# Patient Record
Sex: FEMALE | Race: ASIAN | NOT HISPANIC OR LATINO | Employment: OTHER | URBAN - METROPOLITAN AREA
[De-identification: names, ages, dates, MRNs, and addresses within clinical notes are randomized per-mention and may not be internally consistent; named-entity substitution may affect disease eponyms.]

---

## 2020-10-24 ENCOUNTER — IMMUNIZATION (OUTPATIENT)
Dept: SOCIAL WORK | Facility: CLINIC | Age: 75
End: 2020-10-24
Payer: COMMERCIAL

## 2020-10-24 DIAGNOSIS — Z23 NEED FOR VACCINATION: Primary | ICD-10-CM

## 2020-10-24 PROCEDURE — 90662 IIV NO PRSV INCREASED AG IM: CPT | Performed by: REGISTERED NURSE

## 2020-10-24 PROCEDURE — 90471 IMMUNIZATION ADMIN: CPT | Performed by: REGISTERED NURSE

## 2022-04-06 ENCOUNTER — APPOINTMENT (OUTPATIENT)
Dept: RADIOLOGY | Facility: MEDICAL CENTER | Age: 77
End: 2022-04-06
Attending: EMERGENCY MEDICINE
Payer: MEDICAID

## 2022-04-06 ENCOUNTER — HOSPITAL ENCOUNTER (OUTPATIENT)
Facility: MEDICAL CENTER | Age: 77
End: 2022-04-08
Attending: EMERGENCY MEDICINE | Admitting: STUDENT IN AN ORGANIZED HEALTH CARE EDUCATION/TRAINING PROGRAM
Payer: MEDICAID

## 2022-04-06 DIAGNOSIS — S09.90XA CLOSED HEAD INJURY, INITIAL ENCOUNTER: ICD-10-CM

## 2022-04-06 DIAGNOSIS — M10.9 GOUT, UNSPECIFIED CAUSE, UNSPECIFIED CHRONICITY, UNSPECIFIED SITE: ICD-10-CM

## 2022-04-06 DIAGNOSIS — R55 SYNCOPE AND COLLAPSE: ICD-10-CM

## 2022-04-06 DIAGNOSIS — I10 HYPERTENSION, UNSPECIFIED TYPE: ICD-10-CM

## 2022-04-06 DIAGNOSIS — S01.01XA LACERATION OF SCALP, INITIAL ENCOUNTER: ICD-10-CM

## 2022-04-06 DIAGNOSIS — R55 SYNCOPE, UNSPECIFIED SYNCOPE TYPE: ICD-10-CM

## 2022-04-06 PROBLEM — R73.9 HYPERGLYCEMIA: Status: ACTIVE | Noted: 2022-04-06

## 2022-04-06 PROBLEM — E87.1 HYPONATREMIA: Status: ACTIVE | Noted: 2022-04-06

## 2022-04-06 PROBLEM — Z71.89 ACP (ADVANCE CARE PLANNING): Status: ACTIVE | Noted: 2022-04-06

## 2022-04-06 LAB
ALBUMIN SERPL BCP-MCNC: 4.3 G/DL (ref 3.2–4.9)
ALBUMIN/GLOB SERPL: 1.3 G/DL
ALP SERPL-CCNC: 101 U/L (ref 30–99)
ALT SERPL-CCNC: 23 U/L (ref 2–50)
ANION GAP SERPL CALC-SCNC: 14 MMOL/L (ref 7–16)
APPEARANCE UR: CLEAR
AST SERPL-CCNC: 27 U/L (ref 12–45)
BACTERIA #/AREA URNS HPF: NEGATIVE /HPF
BASOPHILS # BLD AUTO: 0.3 % (ref 0–1.8)
BASOPHILS # BLD: 0.03 K/UL (ref 0–0.12)
BILIRUB SERPL-MCNC: 0.5 MG/DL (ref 0.1–1.5)
BILIRUB UR QL STRIP.AUTO: NEGATIVE
BUN SERPL-MCNC: 21 MG/DL (ref 8–22)
CALCIUM SERPL-MCNC: 9.5 MG/DL (ref 8.5–10.5)
CHLORIDE SERPL-SCNC: 98 MMOL/L (ref 96–112)
CO2 SERPL-SCNC: 19 MMOL/L (ref 20–33)
COLOR UR: YELLOW
CREAT SERPL-MCNC: 0.63 MG/DL (ref 0.5–1.4)
EOSINOPHIL # BLD AUTO: 0.2 K/UL (ref 0–0.51)
EOSINOPHIL NFR BLD: 2.1 % (ref 0–6.9)
EPI CELLS #/AREA URNS HPF: NEGATIVE /HPF
ERYTHROCYTE [DISTWIDTH] IN BLOOD BY AUTOMATED COUNT: 49.7 FL (ref 35.9–50)
GFR SERPLBLD CREATININE-BSD FMLA CKD-EPI: 92 ML/MIN/1.73 M 2
GLOBULIN SER CALC-MCNC: 3.4 G/DL (ref 1.9–3.5)
GLUCOSE BLD STRIP.AUTO-MCNC: 107 MG/DL (ref 65–99)
GLUCOSE SERPL-MCNC: 149 MG/DL (ref 65–99)
GLUCOSE UR STRIP.AUTO-MCNC: NEGATIVE MG/DL
HCT VFR BLD AUTO: 42.9 % (ref 37–47)
HGB BLD-MCNC: 14.1 G/DL (ref 12–16)
HYALINE CASTS #/AREA URNS LPF: NORMAL /LPF
IMM GRANULOCYTES # BLD AUTO: 0.04 K/UL (ref 0–0.11)
IMM GRANULOCYTES NFR BLD AUTO: 0.4 % (ref 0–0.9)
KETONES UR STRIP.AUTO-MCNC: NEGATIVE MG/DL
LEUKOCYTE ESTERASE UR QL STRIP.AUTO: ABNORMAL
LYMPHOCYTES # BLD AUTO: 2.62 K/UL (ref 1–4.8)
LYMPHOCYTES NFR BLD: 27.4 % (ref 22–41)
MCH RBC QN AUTO: 31.5 PG (ref 27–33)
MCHC RBC AUTO-ENTMCNC: 32.9 G/DL (ref 33.6–35)
MCV RBC AUTO: 95.8 FL (ref 81.4–97.8)
MICRO URNS: ABNORMAL
MONOCYTES # BLD AUTO: 0.73 K/UL (ref 0–0.85)
MONOCYTES NFR BLD AUTO: 7.6 % (ref 0–13.4)
NEUTROPHILS # BLD AUTO: 5.95 K/UL (ref 2–7.15)
NEUTROPHILS NFR BLD: 62.2 % (ref 44–72)
NITRITE UR QL STRIP.AUTO: NEGATIVE
NRBC # BLD AUTO: 0 K/UL
NRBC BLD-RTO: 0 /100 WBC
PH UR STRIP.AUTO: 6.5 [PH] (ref 5–8)
PLATELET # BLD AUTO: 289 K/UL (ref 164–446)
PMV BLD AUTO: 9 FL (ref 9–12.9)
POTASSIUM SERPL-SCNC: 3.9 MMOL/L (ref 3.6–5.5)
PROT SERPL-MCNC: 7.7 G/DL (ref 6–8.2)
PROT UR QL STRIP: NEGATIVE MG/DL
RBC # BLD AUTO: 4.48 M/UL (ref 4.2–5.4)
RBC # URNS HPF: NORMAL /HPF
RBC UR QL AUTO: NEGATIVE
SODIUM SERPL-SCNC: 131 MMOL/L (ref 135–145)
SP GR UR STRIP.AUTO: 1.01
TROPONIN T SERPL-MCNC: 7 NG/L (ref 6–19)
TROPONIN T SERPL-MCNC: 8 NG/L (ref 6–19)
UROBILINOGEN UR STRIP.AUTO-MCNC: 0.2 MG/DL
WBC # BLD AUTO: 9.6 K/UL (ref 4.8–10.8)
WBC #/AREA URNS HPF: NORMAL /HPF

## 2022-04-06 PROCEDURE — 85025 COMPLETE CBC W/AUTO DIFF WBC: CPT

## 2022-04-06 PROCEDURE — 70450 CT HEAD/BRAIN W/O DYE: CPT

## 2022-04-06 PROCEDURE — 700101 HCHG RX REV CODE 250: Performed by: EMERGENCY MEDICINE

## 2022-04-06 PROCEDURE — 700111 HCHG RX REV CODE 636 W/ 250 OVERRIDE (IP): Performed by: EMERGENCY MEDICINE

## 2022-04-06 PROCEDURE — 84484 ASSAY OF TROPONIN QUANT: CPT | Mod: 91

## 2022-04-06 PROCEDURE — 82962 GLUCOSE BLOOD TEST: CPT

## 2022-04-06 PROCEDURE — 304999 HCHG REPAIR-SIMPLE/INTERMED LEVEL 1

## 2022-04-06 PROCEDURE — 36415 COLL VENOUS BLD VENIPUNCTURE: CPT

## 2022-04-06 PROCEDURE — G0378 HOSPITAL OBSERVATION PER HR: HCPCS

## 2022-04-06 PROCEDURE — 304217 HCHG IRRIGATION SYSTEM

## 2022-04-06 PROCEDURE — 81001 URINALYSIS AUTO W/SCOPE: CPT

## 2022-04-06 PROCEDURE — 80053 COMPREHEN METABOLIC PANEL: CPT

## 2022-04-06 PROCEDURE — 305308 HCHG STAPLER,SKIN,DISP.

## 2022-04-06 PROCEDURE — 90715 TDAP VACCINE 7 YRS/> IM: CPT | Performed by: EMERGENCY MEDICINE

## 2022-04-06 PROCEDURE — 90471 IMMUNIZATION ADMIN: CPT

## 2022-04-06 PROCEDURE — 99497 ADVNCD CARE PLAN 30 MIN: CPT | Performed by: STUDENT IN AN ORGANIZED HEALTH CARE EDUCATION/TRAINING PROGRAM

## 2022-04-06 PROCEDURE — 99285 EMERGENCY DEPT VISIT HI MDM: CPT

## 2022-04-06 PROCEDURE — 71045 X-RAY EXAM CHEST 1 VIEW: CPT

## 2022-04-06 PROCEDURE — 93005 ELECTROCARDIOGRAM TRACING: CPT | Mod: XE | Performed by: EMERGENCY MEDICINE

## 2022-04-06 PROCEDURE — 99220 PR INITIAL OBSERVATION CARE,LEVL III: CPT | Mod: 25 | Performed by: STUDENT IN AN ORGANIZED HEALTH CARE EDUCATION/TRAINING PROGRAM

## 2022-04-06 PROCEDURE — 72125 CT NECK SPINE W/O DYE: CPT

## 2022-04-06 RX ORDER — ALLOPURINOL 100 MG/1
300 TABLET ORAL DAILY
Status: DISCONTINUED | OUTPATIENT
Start: 2022-04-07 | End: 2022-04-08 | Stop reason: HOSPADM

## 2022-04-06 RX ORDER — HYDRALAZINE HYDROCHLORIDE 20 MG/ML
10 INJECTION INTRAMUSCULAR; INTRAVENOUS EVERY 4 HOURS PRN
Status: DISCONTINUED | OUTPATIENT
Start: 2022-04-06 | End: 2022-04-08 | Stop reason: HOSPADM

## 2022-04-06 RX ORDER — POLYETHYLENE GLYCOL 3350 17 G/17G
1 POWDER, FOR SOLUTION ORAL
Status: DISCONTINUED | OUTPATIENT
Start: 2022-04-06 | End: 2022-04-08 | Stop reason: HOSPADM

## 2022-04-06 RX ORDER — LIDOCAINE HYDROCHLORIDE AND EPINEPHRINE BITARTRATE 20; .01 MG/ML; MG/ML
20 INJECTION, SOLUTION SUBCUTANEOUS ONCE
Status: COMPLETED | OUTPATIENT
Start: 2022-04-06 | End: 2022-04-06

## 2022-04-06 RX ORDER — ONDANSETRON 2 MG/ML
4 INJECTION INTRAMUSCULAR; INTRAVENOUS EVERY 4 HOURS PRN
Status: DISCONTINUED | OUTPATIENT
Start: 2022-04-06 | End: 2022-04-08 | Stop reason: HOSPADM

## 2022-04-06 RX ORDER — ACETAMINOPHEN 325 MG/1
650 TABLET ORAL EVERY 6 HOURS PRN
Status: DISCONTINUED | OUTPATIENT
Start: 2022-04-06 | End: 2022-04-08 | Stop reason: HOSPADM

## 2022-04-06 RX ORDER — CETIRIZINE HYDROCHLORIDE 10 MG/1
10 TABLET ORAL DAILY
Status: DISCONTINUED | OUTPATIENT
Start: 2022-04-07 | End: 2022-04-08 | Stop reason: HOSPADM

## 2022-04-06 RX ORDER — ONDANSETRON 4 MG/1
4 TABLET, ORALLY DISINTEGRATING ORAL EVERY 4 HOURS PRN
Status: DISCONTINUED | OUTPATIENT
Start: 2022-04-06 | End: 2022-04-08 | Stop reason: HOSPADM

## 2022-04-06 RX ORDER — AMOXICILLIN 250 MG
2 CAPSULE ORAL 2 TIMES DAILY
Status: DISCONTINUED | OUTPATIENT
Start: 2022-04-06 | End: 2022-04-08 | Stop reason: HOSPADM

## 2022-04-06 RX ORDER — CETIRIZINE HYDROCHLORIDE 10 MG/1
10 TABLET ORAL DAILY
COMMUNITY

## 2022-04-06 RX ORDER — AMLODIPINE BESYLATE 5 MG/1
5 TABLET ORAL DAILY
Status: ON HOLD | COMMUNITY
End: 2022-04-08

## 2022-04-06 RX ORDER — ALLOPURINOL 300 MG/1
300 TABLET ORAL DAILY
Status: SHIPPED | COMMUNITY
End: 2022-04-13 | Stop reason: SDUPTHER

## 2022-04-06 RX ORDER — AMLODIPINE BESYLATE 5 MG/1
5 TABLET ORAL DAILY
Status: DISCONTINUED | OUTPATIENT
Start: 2022-04-07 | End: 2022-04-08 | Stop reason: HOSPADM

## 2022-04-06 RX ORDER — MELOXICAM 7.5 MG/1
15 TABLET ORAL PRN
Status: SHIPPED | COMMUNITY
End: 2022-04-13 | Stop reason: SDUPTHER

## 2022-04-06 RX ORDER — LOSARTAN POTASSIUM 100 MG/1
100 TABLET ORAL DAILY
Status: ON HOLD | COMMUNITY
End: 2022-04-08

## 2022-04-06 RX ORDER — BISACODYL 10 MG
10 SUPPOSITORY, RECTAL RECTAL
Status: DISCONTINUED | OUTPATIENT
Start: 2022-04-06 | End: 2022-04-08 | Stop reason: HOSPADM

## 2022-04-06 RX ORDER — LOSARTAN POTASSIUM 50 MG/1
100 TABLET ORAL DAILY
Status: DISCONTINUED | OUTPATIENT
Start: 2022-04-07 | End: 2022-04-08 | Stop reason: HOSPADM

## 2022-04-06 RX ADMIN — LIDOCAINE HYDROCHLORIDE AND EPINEPHRINE 20 ML: 20; 10 INJECTION, SOLUTION INFILTRATION; PERINEURAL at 23:00

## 2022-04-06 RX ADMIN — CLOSTRIDIUM TETANI TOXOID ANTIGEN (FORMALDEHYDE INACTIVATED), CORYNEBACTERIUM DIPHTHERIAE TOXOID ANTIGEN (FORMALDEHYDE INACTIVATED), BORDETELLA PERTUSSIS TOXOID ANTIGEN (GLUTARALDEHYDE INACTIVATED), BORDETELLA PERTUSSIS FILAMENTOUS HEMAGGLUTININ ANTIGEN (FORMALDEHYDE INACTIVATED), BORDETELLA PERTUSSIS PERTACTIN ANTIGEN, AND BORDETELLA PERTUSSIS FIMBRIAE 2/3 ANTIGEN 0.5 ML: 5; 2; 2.5; 5; 3; 5 INJECTION, SUSPENSION INTRAMUSCULAR at 20:36

## 2022-04-06 ASSESSMENT — ENCOUNTER SYMPTOMS
FOCAL WEAKNESS: 0
PALPITATIONS: 0
ABDOMINAL PAIN: 0
COUGH: 0
WHEEZING: 0
DOUBLE VISION: 0
TINGLING: 0
LOSS OF CONSCIOUSNESS: 1
SPEECH CHANGE: 0
TREMORS: 0
SENSORY CHANGE: 0
DIZZINESS: 0
BRUISES/BLEEDS EASILY: 0
HEARTBURN: 0
CHILLS: 0
BLURRED VISION: 0
FLANK PAIN: 0
NAUSEA: 0
VOMITING: 0
SHORTNESS OF BREATH: 0
DEPRESSION: 0
FEVER: 0
SEIZURES: 0
HEADACHES: 0
MYALGIAS: 0
FALLS: 0
WEAKNESS: 0

## 2022-04-06 ASSESSMENT — LIFESTYLE VARIABLES: SUBSTANCE_ABUSE: 0

## 2022-04-07 ENCOUNTER — APPOINTMENT (OUTPATIENT)
Dept: CARDIOLOGY | Facility: MEDICAL CENTER | Age: 77
End: 2022-04-07
Attending: STUDENT IN AN ORGANIZED HEALTH CARE EDUCATION/TRAINING PROGRAM
Payer: MEDICAID

## 2022-04-07 LAB
ALBUMIN SERPL BCP-MCNC: 4.1 G/DL (ref 3.2–4.9)
BASOPHILS # BLD AUTO: 0.3 % (ref 0–1.8)
BASOPHILS # BLD: 0.03 K/UL (ref 0–0.12)
BUN SERPL-MCNC: 20 MG/DL (ref 8–22)
CALCIUM SERPL-MCNC: 9.5 MG/DL (ref 8.5–10.5)
CHLORIDE SERPL-SCNC: 103 MMOL/L (ref 96–112)
CO2 SERPL-SCNC: 21 MMOL/L (ref 20–33)
CREAT SERPL-MCNC: 0.58 MG/DL (ref 0.5–1.4)
EKG IMPRESSION: NORMAL
EKG IMPRESSION: NORMAL
EOSINOPHIL # BLD AUTO: 0.12 K/UL (ref 0–0.51)
EOSINOPHIL NFR BLD: 1.3 % (ref 0–6.9)
ERYTHROCYTE [DISTWIDTH] IN BLOOD BY AUTOMATED COUNT: 49.2 FL (ref 35.9–50)
GFR SERPLBLD CREATININE-BSD FMLA CKD-EPI: 93 ML/MIN/1.73 M 2
GLUCOSE BLD STRIP.AUTO-MCNC: 113 MG/DL (ref 65–99)
GLUCOSE BLD STRIP.AUTO-MCNC: 89 MG/DL (ref 65–99)
GLUCOSE SERPL-MCNC: 101 MG/DL (ref 65–99)
HCT VFR BLD AUTO: 41.4 % (ref 37–47)
HGB BLD-MCNC: 13.9 G/DL (ref 12–16)
IMM GRANULOCYTES # BLD AUTO: 0.03 K/UL (ref 0–0.11)
IMM GRANULOCYTES NFR BLD AUTO: 0.3 % (ref 0–0.9)
LV EJECT FRACT  99904: 55
LV EJECT FRACT MOD 2C 99903: 56.49
LV EJECT FRACT MOD 4C 99902: 58.4
LV EJECT FRACT MOD BP 99901: 56.18
LYMPHOCYTES # BLD AUTO: 2.18 K/UL (ref 1–4.8)
LYMPHOCYTES NFR BLD: 23.8 % (ref 22–41)
MAGNESIUM SERPL-MCNC: 2.2 MG/DL (ref 1.5–2.5)
MCH RBC QN AUTO: 31.7 PG (ref 27–33)
MCHC RBC AUTO-ENTMCNC: 33.6 G/DL (ref 33.6–35)
MCV RBC AUTO: 94.3 FL (ref 81.4–97.8)
MONOCYTES # BLD AUTO: 0.85 K/UL (ref 0–0.85)
MONOCYTES NFR BLD AUTO: 9.3 % (ref 0–13.4)
NEUTROPHILS # BLD AUTO: 5.96 K/UL (ref 2–7.15)
NEUTROPHILS NFR BLD: 65 % (ref 44–72)
NRBC # BLD AUTO: 0 K/UL
NRBC BLD-RTO: 0 /100 WBC
PHOSPHATE SERPL-MCNC: 3.8 MG/DL (ref 2.5–4.5)
PLATELET # BLD AUTO: 285 K/UL (ref 164–446)
PMV BLD AUTO: 9 FL (ref 9–12.9)
POTASSIUM SERPL-SCNC: 4.1 MMOL/L (ref 3.6–5.5)
RBC # BLD AUTO: 4.39 M/UL (ref 4.2–5.4)
SODIUM SERPL-SCNC: 137 MMOL/L (ref 135–145)
TROPONIN T SERPL-MCNC: 8 NG/L (ref 6–19)
WBC # BLD AUTO: 9.2 K/UL (ref 4.8–10.8)

## 2022-04-07 PROCEDURE — 93005 ELECTROCARDIOGRAM TRACING: CPT | Performed by: INTERNAL MEDICINE

## 2022-04-07 PROCEDURE — 99225 PR SUBSEQUENT OBSERVATION CARE,LEVEL II: CPT | Performed by: INTERNAL MEDICINE

## 2022-04-07 PROCEDURE — G0378 HOSPITAL OBSERVATION PER HR: HCPCS

## 2022-04-07 PROCEDURE — 93010 ELECTROCARDIOGRAM REPORT: CPT | Performed by: INTERNAL MEDICINE

## 2022-04-07 PROCEDURE — 83735 ASSAY OF MAGNESIUM: CPT

## 2022-04-07 PROCEDURE — 93306 TTE W/DOPPLER COMPLETE: CPT | Mod: 26 | Performed by: INTERNAL MEDICINE

## 2022-04-07 PROCEDURE — 82962 GLUCOSE BLOOD TEST: CPT

## 2022-04-07 PROCEDURE — 93306 TTE W/DOPPLER COMPLETE: CPT

## 2022-04-07 PROCEDURE — 700111 HCHG RX REV CODE 636 W/ 250 OVERRIDE (IP): Performed by: STUDENT IN AN ORGANIZED HEALTH CARE EDUCATION/TRAINING PROGRAM

## 2022-04-07 PROCEDURE — 80069 RENAL FUNCTION PANEL: CPT

## 2022-04-07 PROCEDURE — 84484 ASSAY OF TROPONIN QUANT: CPT

## 2022-04-07 PROCEDURE — 96372 THER/PROPH/DIAG INJ SC/IM: CPT

## 2022-04-07 PROCEDURE — 36415 COLL VENOUS BLD VENIPUNCTURE: CPT

## 2022-04-07 PROCEDURE — 85025 COMPLETE CBC W/AUTO DIFF WBC: CPT

## 2022-04-07 PROCEDURE — A9270 NON-COVERED ITEM OR SERVICE: HCPCS | Performed by: STUDENT IN AN ORGANIZED HEALTH CARE EDUCATION/TRAINING PROGRAM

## 2022-04-07 PROCEDURE — 700102 HCHG RX REV CODE 250 W/ 637 OVERRIDE(OP): Performed by: STUDENT IN AN ORGANIZED HEALTH CARE EDUCATION/TRAINING PROGRAM

## 2022-04-07 RX ORDER — DEXTROSE MONOHYDRATE 25 G/50ML
25 INJECTION, SOLUTION INTRAVENOUS
Status: DISCONTINUED | OUTPATIENT
Start: 2022-04-07 | End: 2022-04-08 | Stop reason: HOSPADM

## 2022-04-07 RX ADMIN — ALLOPURINOL 300 MG: 300 TABLET ORAL at 05:33

## 2022-04-07 RX ADMIN — CETIRIZINE HYDROCHLORIDE 10 MG: 10 TABLET, FILM COATED ORAL at 05:33

## 2022-04-07 RX ADMIN — ENOXAPARIN SODIUM 40 MG: 40 INJECTION SUBCUTANEOUS at 05:33

## 2022-04-07 RX ADMIN — ASPIRIN 81 MG: 81 TABLET, COATED ORAL at 05:33

## 2022-04-07 ASSESSMENT — LIFESTYLE VARIABLES
ALCOHOL_USE: NO
HAVE PEOPLE ANNOYED YOU BY CRITICIZING YOUR DRINKING: NO
EVER HAD A DRINK FIRST THING IN THE MORNING TO STEADY YOUR NERVES TO GET RID OF A HANGOVER: NO
ON A TYPICAL DAY WHEN YOU DRINK ALCOHOL HOW MANY DRINKS DO YOU HAVE: 0
SUBSTANCE_ABUSE: 0
HAVE YOU EVER FELT YOU SHOULD CUT DOWN ON YOUR DRINKING: NO
TOTAL SCORE: 0
TOTAL SCORE: 0
HOW MANY TIMES IN THE PAST YEAR HAVE YOU HAD 5 OR MORE DRINKS IN A DAY: 0
CONSUMPTION TOTAL: NEGATIVE
TOTAL SCORE: 0
EVER FELT BAD OR GUILTY ABOUT YOUR DRINKING: NO
AVERAGE NUMBER OF DAYS PER WEEK YOU HAVE A DRINK CONTAINING ALCOHOL: 0

## 2022-04-07 ASSESSMENT — ENCOUNTER SYMPTOMS
SHORTNESS OF BREATH: 0
BLURRED VISION: 0
WEAKNESS: 0
DEPRESSION: 0
FALLS: 0
SENSORY CHANGE: 0
FEVER: 0
DIZZINESS: 0
HEADACHES: 0
TREMORS: 0
TINGLING: 0
ABDOMINAL PAIN: 0
SPEECH CHANGE: 0
PALPITATIONS: 0
SEIZURES: 0
FOCAL WEAKNESS: 0
CHILLS: 0
BRUISES/BLEEDS EASILY: 0
HEARTBURN: 0
COUGH: 0
NAUSEA: 0
MYALGIAS: 0
WHEEZING: 0
LOSS OF CONSCIOUSNESS: 1
VOMITING: 0
FLANK PAIN: 0
DOUBLE VISION: 0

## 2022-04-07 ASSESSMENT — PATIENT HEALTH QUESTIONNAIRE - PHQ9
SUM OF ALL RESPONSES TO PHQ9 QUESTIONS 1 AND 2: 0
1. LITTLE INTEREST OR PLEASURE IN DOING THINGS: NOT AT ALL
2. FEELING DOWN, DEPRESSED, IRRITABLE, OR HOPELESS: NOT AT ALL

## 2022-04-07 ASSESSMENT — FIBROSIS 4 INDEX: FIB4 SCORE: 1.5

## 2022-04-07 ASSESSMENT — PAIN DESCRIPTION - PAIN TYPE
TYPE: ACUTE PAIN

## 2022-04-07 NOTE — H&P
Hospital Medicine History & Physical Note    Date of Service  4/6/2022    Primary Care Physician  Pcp Pt States None    Consultants  None    Code Status  Full Code    Chief Complaint  Chief Complaint   Patient presents with   • Syncope     Approx 45 mins ago. Pt sitting down and lost consciousness. Witnessed by daughter. Daughter says pt was down for 2-3 mins. +Head trauma. Denies chest pain. Denies dizziness. Pt says this is the first time this has happened.    • Head Injury     Pt hit back of head during fall backwards. Blood to injury.        History of Presenting Illness  Shital Hemphill is a 76 y.o. female with history of hypertension who presented 4/6/2022 with an episode of syncope.  Per daughter, patient was seen sitting in the chair having conversation, all of a sudden appeared as LOC and fell backward and hit her head.  Patient arrived to ER as TBI, but no acute etiology seen on CT head and CT C-spine.  Patient denies tongue bite, loss of urinary or bladder incontinence, focal neurological deficits.  Initial troponin T WNL, nonspecific ST changes on EKG.  Admission requested for observation by ERP.  Mated to medicine service.    I discussed the plan of care with patient, family and bedside RN.    Review of Systems  Review of Systems   Constitutional: Negative for chills and fever.   HENT: Negative for hearing loss and tinnitus.    Eyes: Negative for blurred vision and double vision.   Respiratory: Negative for cough, shortness of breath and wheezing.    Cardiovascular: Negative for chest pain, palpitations and leg swelling.   Gastrointestinal: Negative for abdominal pain, heartburn, nausea and vomiting.   Genitourinary: Negative for dysuria and flank pain.   Musculoskeletal: Negative for falls and myalgias.   Skin: Negative for itching and rash.   Neurological: Positive for loss of consciousness. Negative for dizziness, tingling, tremors, sensory change, speech change, focal weakness, seizures, weakness and  headaches.   Endo/Heme/Allergies: Negative for environmental allergies. Does not bruise/bleed easily.   Psychiatric/Behavioral: Negative for depression and substance abuse.   All other systems reviewed and are negative.      Past Medical History   has a past medical history of Hypertension.    Surgical History   has no past surgical history on file.   Denies PSH    Family History  family history is not on file.   Family history reviewed with patient. There is family history that is pertinent to the chief complaint.   Mother  of massive MI at age 53, denies FH of CVA    Social History   reports that she has never smoked. She has never used smokeless tobacco. She reports previous alcohol use. She reports previous drug use.    Allergies  No Known Allergies    Medications  None       Physical Exam  Temp:  [36.6 °C (97.8 °F)] 36.6 °C (97.8 °F)  Pulse:  [84-97] 84  Resp:  [16-22] 21  BP: (133-153)/(74-87) 144/74  SpO2:  [94 %-98 %] 94 %  Blood Pressure : 148/80   Temperature: 36.6 °C (97.8 °F)   Pulse: 97   Respiration: 19   Pulse Oximetry: 98 %       Physical Exam  Vitals and nursing note reviewed.   Constitutional:       Appearance: She is obese.   HENT:      Head: Normocephalic and atraumatic.      Nose: Nose normal.      Mouth/Throat:      Mouth: Mucous membranes are moist.      Pharynx: Oropharynx is clear.   Eyes:      General: No scleral icterus.     Extraocular Movements: Extraocular movements intact.   Cardiovascular:      Rate and Rhythm: Normal rate and regular rhythm.      Pulses: Normal pulses.      Heart sounds:     No friction rub.   Pulmonary:      Effort: Pulmonary effort is normal. No respiratory distress.      Breath sounds: No wheezing.   Abdominal:      General: There is no distension.      Palpations: Abdomen is soft.      Tenderness: There is no abdominal tenderness. There is no guarding or rebound.   Musculoskeletal:         General: No swelling or tenderness. Normal range of motion.       Cervical back: Neck supple. No tenderness.   Skin:     General: Skin is warm and dry.      Capillary Refill: Capillary refill takes less than 2 seconds.   Neurological:      General: No focal deficit present.      Mental Status: She is alert and oriented to person, place, and time.      Motor: No weakness.   Psychiatric:         Mood and Affect: Mood normal.         Laboratory:  Recent Labs     04/06/22 1900   WBC 9.6   RBC 4.48   HEMOGLOBIN 14.1   HEMATOCRIT 42.9   MCV 95.8   MCH 31.5   MCHC 32.9*   RDW 49.7   PLATELETCT 289   MPV 9.0     Recent Labs     04/06/22  1900   SODIUM 131*   POTASSIUM 3.9   CHLORIDE 98   CO2 19*   GLUCOSE 149*   BUN 21   CREATININE 0.63   CALCIUM 9.5     Recent Labs     04/06/22 1900   ALTSGPT 23   ASTSGOT 27   ALKPHOSPHAT 101*   TBILIRUBIN 0.5   GLUCOSE 149*         No results for input(s): NTPROBNP in the last 72 hours.      Recent Labs     04/06/22 1900   TROPONINT 7       Imaging:  DX-CHEST-PORTABLE (1 VIEW)   Final Result         1. No acute cardiopulmonary abnormalities are identified.      CT-HEAD W/O   Final Result         1. No acute intracranial abnormality. No evidence of acute intracranial hemorrhage or mass lesion.                     CT-CSPINE WITHOUT PLUS RECONS   Final Result         1. No acute fracture from C1 through T1 is visualized.         EC-ECHOCARDIOGRAM COMPLETE W/O CONT    (Results Pending)       X-Ray:  I have personally reviewed the images and compared with prior images.  EKG:  I have personally reviewed the images and compared with prior images.    Assessment/Plan:  I anticipate this patient is appropriate for observation status at this time.    * Syncope and collapse- (present on admission)  Assessment & Plan  Probable orthostatic or vasovagal/neurocardiogenic  No acute etiology seen on CT head or CT C-spine  Orthostatic VS  Telemetry monitoring  Follow-up echo    Hyperglycemia  Assessment & Plan  F/u HbA1c    Hyponatremia  Assessment & Plan  Mild,  monitor    Occipital scalp laceration  Assessment & Plan  Status post lidocaine injection and suture repair    Gout  Assessment & Plan  Allopurinol    HTN (hypertension)  Assessment & Plan  Home meds    ACP (advance care planning)  Assessment & Plan  D/w pt and pt's caregiver daughter in ER -- FULL code per pt's wish  ACP: 16mins      VTE prophylaxis: enoxaparin ppx

## 2022-04-07 NOTE — ASSESSMENT & PLAN NOTE
Probable orthostatic or vasovagal/neurocardiogenic  No acute etiology seen on CT head or CT C-spine  Orthostatic VS  Telemetry monitoring  Follow-up echo

## 2022-04-07 NOTE — PROGRESS NOTES
Pt arrive to unit via bed with CAROLIN Cerna. Tele monitor on. Call light and personal belongings within reach of pt.

## 2022-04-07 NOTE — ED NOTES
Medication Reconciliation Complete!    Patient does not take any otc NSAIDs.   Patient does not have any drug allergies.   Patients pharmacy is [facility] [phone].

## 2022-04-07 NOTE — ED PROVIDER NOTES
"ED Provider Note    Scribed for Kortney Cavanaugh M.D. by Herbert Turner. 4/6/2022  6:40 PM    Primary care provider: No primary care provider reported  Means of arrival: EMS  History obtained from: Patient, daughter  History limited by: None  CHIEF COMPLAINT  Chief Complaint   Patient presents with   • Syncope     Approx 45 mins ago. Pt sitting down and lost consciousness. Witnessed by daughter. Daughter says pt was down for 2-3 mins. +Head trauma. Denies chest pain. Denies dizziness. Pt says this is the first time this has happened.    • Head Injury     Pt hit back of head during fall backwards. Blood to injury.      HPI  Shital Hemphill is a 76 y.o. female who presents to the Emergency Department as a code TBI for an acute syncopal episode which occurred 45 minutes prior to arrival. The syncopal episode was witnessed by the patient's daughter. Patient denies a history of syncope. Patient's daughter reports she was sitting on a stool in their living room and she was speaking to the patient when the patient then said \"it is getting dark\" and the patient then had a syncopal episode. This caused the patient to hit the back of her head. Patient's daugther reports the patient's syncopal episode lasted for 1 minute. After, patient's daughter reports the patient struck her head. Patient reports she last ate this afternoon around 5:30pm and her syncopal episode occurred around 6pm. Due to the syncopal episode the patient was prompted to call EMS who presented the patient to the ED. She reports associated headache. There are no alleviating or exacerbating factors reported at this time. She denies associated dysuria, cloudy urine, urinary frequency, decreased appetite, unilateral upper extremity weakness, unilateral lower extremity weakness, seizure-like activity, dizziness, lightheadedness, back pain, abdominal pain, chest pain, blurry vision, cough, runny nose, or other flu like symptoms. Patient is not UTD on her tetanus. Patient " "is not on any anticoagulation.    REVIEW OF SYSTEMS  Pertinent positives include acute syncopal episode, headache.   Pertinent negatives include no  dysuria, cloudy urine, urinary frequency, decreased appetite, unilateral upper extremity weakness, unilateral lower extremity weakness, seizure-like activity, dizziness, lightheadedness, back pain, abdominal pain, chest pain, blurry vision, cough, runny nose, or other flu like symptoms.   See HPI for further details. All other systems are negative.    PAST MEDICAL HISTORY  Past Medical History:   Diagnosis Date   • Hypertension        FAMILY HISTORY  History reviewed. No pertinent family history.    SOCIAL HISTORY  Social History     Tobacco Use   • Smoking status: Never Smoker   • Smokeless tobacco: Never Used   Substance Use Topics   • Alcohol use: Not Currently   • Drug use: Not Currently      Social History     Substance and Sexual Activity   Drug Use Not Currently       SURGICAL HISTORY  History reviewed. No pertinent surgical history.    CURRENT MEDICATIONS  No current outpatient medications     ALLERGIES  No Known Allergies    PHYSICAL EXAM  VITAL SIGNS: /87   Pulse 89   Temp 36.6 °C (97.8 °F) (Temporal)   Resp 16   Ht 1.626 m (5' 4\")   Wt 93 kg (205 lb)   SpO2 96%   BMI 35.19 kg/m²    Constitutional: Well developed, well nourished; No acute distress; Non-toxic appearance. Elevated BMI  HENT: Normocephalic; Bilateral external ears normal; Oropharynx with moist mucous membranes; No erythema or exudates in the posterior oropharynx. 3mm laceration to the left parietal scalp.   Eyes: PERRL, EOMI, Conjunctiva normal. No discharge.   Neck:  Supple, nontender midline; No stridor; No nuchal rigidity. Non tender C-Spine  Lymphatic: No cervical lymphadenopathy noted.   Cardiovascular: Regular rate and rhythm without murmurs, rubs, or gallop.   Thorax & Lungs: No respiratory distress, breath sounds clear to auscultation bilaterally without wheezing, rales or " rhonchi. Nontender chest wall. No crepitus or subcutaneous air  Abdomen: Soft, nontender, bowel sounds normal. No obvious masses; No pulsatile masses; no rebound, guarding, or peritoneal signs.   Skin: Good color; warm and dry without rash or petechia.  Back: Nontender, No CVA tenderness. Non tender T or L-Spine  Extremities: Distal radial, dorsalis pedis, posterior tibial pulses are equal bilaterally; No edema; Nontender calves or saphenous, No cyanosis, No clubbing.   Musculoskeletal: Good range of motion in all major joints. No tenderness to palpation or major deformities noted.   Neurologic: Alert & oriented x 4, clear speech.    EKG  12 lead EKG interpreted by me as noted below.    LABS/RADIOLOGY/PROCEDURES  Results for orders placed or performed during the hospital encounter of 04/06/22   CBC WITH DIFFERENTIAL   Result Value Ref Range    WBC 9.6 4.8 - 10.8 K/uL    RBC 4.48 4.20 - 5.40 M/uL    Hemoglobin 14.1 12.0 - 16.0 g/dL    Hematocrit 42.9 37.0 - 47.0 %    MCV 95.8 81.4 - 97.8 fL    MCH 31.5 27.0 - 33.0 pg    MCHC 32.9 (L) 33.6 - 35.0 g/dL    RDW 49.7 35.9 - 50.0 fL    Platelet Count 289 164 - 446 K/uL    MPV 9.0 9.0 - 12.9 fL    Neutrophils-Polys 62.20 44.00 - 72.00 %    Lymphocytes 27.40 22.00 - 41.00 %    Monocytes 7.60 0.00 - 13.40 %    Eosinophils 2.10 0.00 - 6.90 %    Basophils 0.30 0.00 - 1.80 %    Immature Granulocytes 0.40 0.00 - 0.90 %    Nucleated RBC 0.00 /100 WBC    Neutrophils (Absolute) 5.95 2.00 - 7.15 K/uL    Lymphs (Absolute) 2.62 1.00 - 4.80 K/uL    Monos (Absolute) 0.73 0.00 - 0.85 K/uL    Eos (Absolute) 0.20 0.00 - 0.51 K/uL    Baso (Absolute) 0.03 0.00 - 0.12 K/uL    Immature Granulocytes (abs) 0.04 0.00 - 0.11 K/uL    NRBC (Absolute) 0.00 K/uL   COMP METABOLIC PANEL   Result Value Ref Range    Sodium 131 (L) 135 - 145 mmol/L    Potassium 3.9 3.6 - 5.5 mmol/L    Chloride 98 96 - 112 mmol/L    Co2 19 (L) 20 - 33 mmol/L    Anion Gap 14.0 7.0 - 16.0    Glucose 149 (H) 65 - 99 mg/dL     Bun 21 8 - 22 mg/dL    Creatinine 0.63 0.50 - 1.40 mg/dL    Calcium 9.5 8.5 - 10.5 mg/dL    AST(SGOT) 27 12 - 45 U/L    ALT(SGPT) 23 2 - 50 U/L    Alkaline Phosphatase 101 (H) 30 - 99 U/L    Total Bilirubin 0.5 0.1 - 1.5 mg/dL    Albumin 4.3 3.2 - 4.9 g/dL    Total Protein 7.7 6.0 - 8.2 g/dL    Globulin 3.4 1.9 - 3.5 g/dL    A-G Ratio 1.3 g/dL   TROPONIN   Result Value Ref Range    Troponin T 7 6 - 19 ng/L   URINALYSIS (UA)    Specimen: Urine   Result Value Ref Range    Color Yellow     Character Clear     Specific Gravity 1.013 <1.035    Ph 6.5 5.0 - 8.0    Glucose Negative Negative mg/dL    Ketones Negative Negative mg/dL    Protein Negative Negative mg/dL    Bilirubin Negative Negative    Urobilinogen, Urine 0.2 Negative    Nitrite Negative Negative    Leukocyte Esterase Trace (A) Negative    Occult Blood Negative Negative    Micro Urine Req Microscopic    ESTIMATED GFR   Result Value Ref Range    GFR (CKD-EPI) 92 >60 mL/min/1.73 m 2   URINE MICROSCOPIC (W/UA)   Result Value Ref Range    WBC 0-2 /hpf    RBC 0-2 /hpf    Bacteria Negative None /hpf    Epithelial Cells Negative /hpf    Hyaline Cast 0-2 /lpf   TROPONIN   Result Value Ref Range    Troponin T 8 6 - 19 ng/L   EKG   Result Value Ref Range    Report       [facility] Emergency Dept.    Test Date:  2022  Pt Name:    JORGE ORTIZ                 Department: ER  MRN:        [id number]                      Room:       RD 12  Gender:     Female                       Technician: [id number]  :        [id number]                   Requested By:ISAURA CAVANAUGH  Order #:    [id number]                    Reading MD: Isaura Cavanaugh    Measurements  Intervals                                Axis  Rate:       85                           P:          16  SC:         156                          QRS:        15  QRSD:       105                          T:          57  QT:         374  QTc:        445    Interpretive Statements  Sinus rhythm rate 85  Normal  axis  Normal intervals  No ST elevation or depression  T waves flat AVL  Ventricular premature complex  No previous ECG available for comparison  Electronically Signed On 4-7-2022 0:11:02 PDT by Kortney Cavanaugh     POCT glucose device results   Result Value Ref Range    POC Glucose, Blood 107 (H) 65 - 99 mg/dL         DX-CHEST-PORTABLE (1 VIEW)   Final Result         1. No acute cardiopulmonary abnormalities are identified.      CT-HEAD W/O   Final Result         1. No acute intracranial abnormality. No evidence of acute intracranial hemorrhage or mass lesion.                     CT-CSPINE WITHOUT PLUS RECONS   Final Result         1. No acute fracture from C1 through T1 is visualized.         EC-ECHOCARDIOGRAM COMPLETE W/O CONT    (Results Pending)     Laceration Repair Procedure Note    Indication: Laceration    Procedure: The patient was placed in the appropriate position and anesthesia around the laceration was obtained by infiltration using 1% Lidocaine with epinephrine. The area was then irrigated with normal saline. The laceration was closed with 1 staple. There were no additional lacerations requiring repair. The wound area was then dressed with gauze.      Total repaired wound length: 3mm.     Other Items: Staple count: 1    The patient tolerated the procedure well.    Complications: None    COURSE & MEDICAL DECISION MAKING  Pertinent Labs & Imaging studies reviewed. (See chart for details)    Reviewed patient's old medical records which showed no recent hospitalization.    6:40 PM - Patient seen and examined at bedside. Discussed plan of care, including labs and imaging. Patient agrees to the plan of care. Ordered labs and imaging.     7:32 PM - Patient was reevaluated at bedside. Informed patient of reassuring lab and imaging results. Patient's daughter reports the patient has not had a recent tetanus shot. Furthermore, she reports the patient is on hypertension and hyperlipidemia medication. Informed daughter  "symptoms do not line up with a possible seizure, or stroke. Furthermore, I informed the patient's daughter and the patient of plans to hospitalize overnight for observation.    7:42 PM - Patient treated with Adacel 0.5 mL due to the patient not having a recent Tetanus vaccination.    8:14 PM - Paged Hospitalist.    8:27 PM - I discussed the patient's case and the above findings with Dr. Rogers (hospitalist) who agrees to assess the patient for hospitalization.    10:47 PM - Patient was reevaluated at bedside. Informed patient of plan for laceration repair.    Patient presents to the ER as a code TBI after she had a syncopal episode and hit her head on an extension cord which was plugged into the wall after she passed out and fell backward.  She was sitting on a chair speaking to her daughter when the episode occurred.  Daughter said that she was having a normal conversation with the patient.  Patient did not feel unwell prior to the syncopal episode.  No chest pain.  No abdominal pain.  No back pain.  Patient reports that her vision got a little \"dark\" a few seconds before she passed out.  Otherwise she denies dizziness.  Currently she feels fine other than she has a bit of a headache.  She has a 3 mm puncture wound to the left parietal area.  It was closed easily using 1 staple.  Patient tolerated procedure well.  CT brain and CT C-spine were performed upon arrival into the ED.  CT of the C-spine is negative for any acute fracture.  CT head is negative for head bleed and negative for skull fracture.  Perspective patient syncopal episode, she had no chest pain.  No shortness of breath.  She feels fine now.  She is not tachycardic or hypoxic.  She has not had any recent shortness of breath, cough, fever, chills, dizziness or lightheadedness.   I do not think this is pulmonary embolism.  She is never passed out before.  EKG is normal except for some PVCs.  Initial troponin is negative.  At this time no evidence for " STEMI or non-STEMI.  Electrolytes are normal except for a slightly low sodium at 131.  She is not anemic.  White count is normal.  Urine is clear.  Chest x-ray is unremarkable.  At this time I do not have a good explanation for patient's syncopal episode, however, I think it is worthwhile to hospitalize her overnight for telemetry monitoring and serial cardiac enzymes.  I spoke with Dr. Rolon, hospitalist on-call, and he will kindly evaluate the patient hospitalization.     DISPOSITION:  Patient will be hospitalized by Dr. Rogers in guarded condition.     FINAL IMPRESSION  1. Closed head injury, initial encounter Acute   2. Laceration of scalp, initial encounter Acute   3. Syncope, unspecified syncope type Acute         Herbert BRASWELL (Scribe), am scribing for, and in the presence of, Kortney Cavanaugh M.D..    Electronically signed by: Herbert Turner (Scribe), 4/6/2022    Kortney BRASWELL M.D. personally performed the services described in this documentation, as scribed by Herbert Turner in my presence, and it is both accurate and complete.    This dictation has been created using voice recognition software. The accuracy of the dictation is limited by the abilities of the software. I expect there may be some errors of grammar and possibly content. I made every attempt to manually correct the errors within my dictation. However, errors related to voice recognition software may still exist and should be interpreted within the appropriate context.    The note accurately reflects work and decisions made by me.  Kortney Cavanaugh M.D.  4/7/2022  12:06 AM

## 2022-04-07 NOTE — ED TRIAGE NOTES
"Chief Complaint   Patient presents with   • Syncope     Approx 45 mins ago. Pt sitting down and lost consciousness. Witnessed by daughter. Daughter says pt was down for 2-3 mins. +Head trauma. Denies chest pain. Denies dizziness. Pt says this is the first time this has happened.    • Head Injury     Pt hit back of head during fall backwards. Blood to injury.      /87   Pulse 89   Temp 36.6 °C (97.8 °F) (Temporal)   Resp 16   Ht 1.626 m (5' 4\")   Wt 93 kg (205 lb)   SpO2 96%   BMI 35.19 kg/m²     "

## 2022-04-07 NOTE — PROGRESS NOTES
4 Eyes Skin Assessment Completed by CAROLIN Manzano and CAROLIN Foley.    Head Swelling -1 staple noted.  Ears WDL  Nose WDL  Mouth WDL  Neck WDL  Breast/Chest WDL  Shoulder Blades WDL  Spine WDL  (R) Arm/Elbow/Hand Bruising  (L) Arm/Elbow/Hand WDL  Abdomen WDL  Groin WDL  Scrotum/Coccyx/Buttocks WDL  (R) Leg WDL  (L) Leg WDL  (R) Heel/Foot/Toe WDL  (L) Heel/Foot/Toe WDL          Devices In Places Pulse Ox      Interventions In Place Pillows    Possible Skin Injury No    Pictures Uploaded Into Epic N/A  Wound Consult Placed N/A  RN Wound Prevention Protocol Ordered No

## 2022-04-08 ENCOUNTER — TELEPHONE (OUTPATIENT)
Dept: SCHEDULING | Facility: IMAGING CENTER | Age: 77
End: 2022-04-08

## 2022-04-08 VITALS
OXYGEN SATURATION: 95 % | HEART RATE: 104 BPM | HEIGHT: 64 IN | WEIGHT: 207.67 LBS | SYSTOLIC BLOOD PRESSURE: 113 MMHG | TEMPERATURE: 97 F | DIASTOLIC BLOOD PRESSURE: 73 MMHG | RESPIRATION RATE: 16 BRPM | BODY MASS INDEX: 35.45 KG/M2

## 2022-04-08 PROBLEM — R55 SYNCOPE AND COLLAPSE: Status: RESOLVED | Noted: 2022-04-06 | Resolved: 2022-04-08

## 2022-04-08 PROBLEM — E87.1 HYPONATREMIA: Status: RESOLVED | Noted: 2022-04-06 | Resolved: 2022-04-08

## 2022-04-08 LAB
GLUCOSE BLD STRIP.AUTO-MCNC: 104 MG/DL (ref 65–99)
GLUCOSE BLD STRIP.AUTO-MCNC: 98 MG/DL (ref 65–99)

## 2022-04-08 PROCEDURE — 700111 HCHG RX REV CODE 636 W/ 250 OVERRIDE (IP): Performed by: STUDENT IN AN ORGANIZED HEALTH CARE EDUCATION/TRAINING PROGRAM

## 2022-04-08 PROCEDURE — A9270 NON-COVERED ITEM OR SERVICE: HCPCS | Performed by: STUDENT IN AN ORGANIZED HEALTH CARE EDUCATION/TRAINING PROGRAM

## 2022-04-08 PROCEDURE — 82962 GLUCOSE BLOOD TEST: CPT | Mod: 91

## 2022-04-08 PROCEDURE — 99217 PR OBSERVATION CARE DISCHARGE: CPT | Performed by: NURSE PRACTITIONER

## 2022-04-08 PROCEDURE — 96372 THER/PROPH/DIAG INJ SC/IM: CPT

## 2022-04-08 PROCEDURE — 700105 HCHG RX REV CODE 258: Performed by: NURSE PRACTITIONER

## 2022-04-08 PROCEDURE — G0378 HOSPITAL OBSERVATION PER HR: HCPCS

## 2022-04-08 PROCEDURE — 700102 HCHG RX REV CODE 250 W/ 637 OVERRIDE(OP): Performed by: STUDENT IN AN ORGANIZED HEALTH CARE EDUCATION/TRAINING PROGRAM

## 2022-04-08 RX ORDER — SODIUM CHLORIDE 9 MG/ML
500 INJECTION, SOLUTION INTRAVENOUS ONCE
Status: COMPLETED | OUTPATIENT
Start: 2022-04-08 | End: 2022-04-08

## 2022-04-08 RX ADMIN — LOSARTAN POTASSIUM 100 MG: 50 TABLET, FILM COATED ORAL at 05:10

## 2022-04-08 RX ADMIN — CETIRIZINE HYDROCHLORIDE 10 MG: 10 TABLET, FILM COATED ORAL at 05:09

## 2022-04-08 RX ADMIN — ASPIRIN 81 MG: 81 TABLET, COATED ORAL at 05:10

## 2022-04-08 RX ADMIN — AMLODIPINE BESYLATE 5 MG: 5 TABLET ORAL at 05:10

## 2022-04-08 RX ADMIN — ALLOPURINOL 300 MG: 300 TABLET ORAL at 05:09

## 2022-04-08 RX ADMIN — SODIUM CHLORIDE 500 ML: 9 INJECTION, SOLUTION INTRAVENOUS at 09:33

## 2022-04-08 RX ADMIN — ENOXAPARIN SODIUM 40 MG: 40 INJECTION SUBCUTANEOUS at 05:11

## 2022-04-08 ASSESSMENT — PAIN DESCRIPTION - PAIN TYPE: TYPE: ACUTE PAIN

## 2022-04-08 NOTE — PROGRESS NOTES
Patient care assumed. Report received from Natacha COE    Assessment completed, Patient family at bedside. Pt A&Ox 4. Respirations are even and unlabored on RA. Pt reports 5/10 posterior head laceration site pain, but denies any interventions at this time other than rest. Monitors applied, VS stable, call light and belongings within reach. POC updated (Pain control, safety). Pt educated on room and call light, pt verbalized understanding. Communication board updated. Needs met.

## 2022-04-08 NOTE — CARE PLAN
Problem: Knowledge Deficit - Standard  Goal: Patient and family/care givers will demonstrate understanding of plan of care, disease process/condition, diagnostic tests and medications  Outcome: Progressing   The patient is Stable - Low risk of patient condition declining or worsening    Shift Goals  Clinical Goals: Tele monitor, Echo  Patient Goals: Echo, Rest  Family Goals: Patient Comfort    Progress made toward(s) clinical / shift goals:  Pt updated on POC, all questions answered. Pt instructed on use of call light. Call light and personal belongings within reach.     Patient is not progressing towards the following goals:

## 2022-04-08 NOTE — HOSPITAL COURSE
Ms. Shital Hemphill is a 76-year-old female with a PMHx of HTN who presented on 4//2022 due to a syncopal episode.    Patient was talking to her daughter when the syncopal episode happened and was sitting on a chair.  All of a sudden, patient notes that the room was darkening and fell backwards and hit her head.  EMS was called and patient was brought to the hospital.    Patient arrived in ER for TBI with no acute etiology seen on CT head and CT of C-spine.  Patient denies any tongue biting, no loss of urinary or bladder incontinence, no focal neurological deficits.  Initial troponin T was within normal limits.  Initial EKG noted nonspecific ST changes.  Patient admitted into the observation unit for further evaluation and treatment.    During his hospital stay, an updated echocardiogram was obtained which noted normal left ventricular chamber size, left ventricle ejection fraction is visually estimated at 55% with normal inferior vena cava size, normal right ventricular systolic pressure at 35 mmHg.    Patient seen and examined.  Discussed with patient and family who are at bedside regarding CT imaging results along with echocardiogram results.  Patient noted to be hypotensive throughout hospital stay.  Patient given 500 mL saline bolus.  Patient instructed to HOLD all blood pressure medication until seen by primary care physician.  Patient instructed to keep self hydrated at all times.  Patient to check blood pressure twice daily.  If SBP is less than 90 mmHg, ensure patient is hydrating, if SBP is greater than 140 mmHg, utilize antihypertensive medication.  Discussed changes with patient and family.  All questions and concerns answered prior to discharge.  Patient discharged home.

## 2022-04-08 NOTE — CARE PLAN
The patient is Watcher - Medium risk of patient condition declining or worsening    Shift Goals  Clinical Goals: Echo results, Safety  Patient Goals: rest  Family Goals: comfort      Problem: Knowledge Deficit - Standard  Goal: Patient and family/care givers will demonstrate understanding of plan of care, disease process/condition, diagnostic tests and medications  Outcome: Progressing     Problem: Pain - Standard  Goal: Alleviation of pain or a reduction in pain to the patient’s comfort goal  Outcome: Progressing

## 2022-04-08 NOTE — DISCHARGE INSTRUCTIONS
Discharge Instructions    Discharged to home by car with relative. Discharged via wheelchair, hospital escort: Yes.  Special equipment needed: Not Applicable    Be sure to schedule a follow-up appointment with your primary care doctor or any specialists as instructed.     Discharge Plan:   Diet Plan: Discussed  Activity Level: Discussed  Confirmed Follow up Appointment: Patient to Call and Schedule Appointment  Confirmed Symptoms Management: Discussed  Medication Reconciliation Updated: Yes    I understand that a diet low in cholesterol, fat, and sodium is recommended for good health. Unless I have been given specific instructions below for another diet, I accept this instruction as my diet prescription.   Other diet: Regular    Special Instructions: None    · Is patient discharged on Warfarin / Coumadin?   No     Depression / Suicide Risk    As you are discharged from this [facility] facility, it is important to learn how to keep safe from harming yourself.    Recognize the warning signs:  · Abrupt changes in personality, positive or negative- including increase in energy   · Giving away possessions  · Change in eating patterns- significant weight changes-  positive or negative  · Change in sleeping patterns- unable to sleep or sleeping all the time   · Unwillingness or inability to communicate  · Depression  · Unusual sadness, discouragement and loneliness  · Talk of wanting to die  · Neglect of personal appearance   · Rebelliousness- reckless behavior  · Withdrawal from people/activities they love  · Confusion- inability to concentrate     If you or a loved one observes any of these behaviors or has concerns about self-harm, here's what you can do:  · Talk about it- your feelings and reasons for harming yourself  · Remove any means that you might use to hurt yourself (examples: pills, rope, extension cords, firearm)  · Get professional help from the community (Mental Health, Substance Abuse, psychological  counseling)  · Do not be alone:Call your Safe Contact- someone whom you trust who will be there for you.  · Call your local CRISIS HOTLINE 892-5343 or 573-636-0412  · Call your local Children's Mobile Crisis Response Team [place] (216) 310-7378 or www.[employer]  · Call the toll free National Suicide Prevention Hotlines   · National Suicide Prevention Lifeline 649-645-VWGR (8026)  · [employer] 800-SUICIDE (402-9762)    Syncope  Syncope is when you pass out (faint) for a short time. It is caused by a sudden decrease in blood flow to the brain. Signs that you may be about to pass out include:  · Feeling dizzy or light-headed.  · Feeling sick to your stomach (nauseous).  · Seeing all white or all black.  · Having cold, clammy skin.  If you pass out, get help right away. Call your local emergency services (911 in the [place] Do not drive yourself to the hospital.  Follow these instructions at home:  Watch for any changes in your symptoms. Take these actions to stay safe and help with your symptoms:  Lifestyle  · Do not drive, use machinery, or play sports until your doctor says it is okay.  · Do not drink alcohol.  · Do not use any products that contain nicotine or tobacco, such as cigarettes and e-cigarettes. If you need help quitting, ask your doctor.  · Drink enough fluid to keep your pee (urine) pale yellow.  General instructions  · Take over-the-counter and prescription medicines only as told by your doctor.  · If you are taking blood pressure or heart medicine, sit up and stand up slowly. Spend a few minutes getting ready to sit and then stand. This can help you feel less dizzy.  · Have someone stay with you until you feel stable.  · If you start to feel like you might pass out, lie down right away and raise (elevate) your feet above the level of your heart. Breathe deeply and steadily. Wait until all of the symptoms are gone.  · Keep all follow-up visits as told by your doctor. This is  important.  Get help right away if:  · You have a very bad headache.  · You pass out once or more than once.  · You have pain in your chest, belly, or back.  · You have a very fast or uneven heartbeat (palpitations).  · It hurts to breathe.  · You are bleeding from your mouth or your bottom (rectum).  · You have black or tarry poop (stool).  · You have jerky movements that you cannot control (seizure).  · You are confused.  · You have trouble walking.  · You are very weak.  · You have vision problems.  These symptoms may be an emergency. Do not wait to see if the symptoms will go away. Get medical help right away. Call your local emergency services (911 in the [place] Do not drive yourself to the hospital.  Summary  · Syncope is when you pass out (faint) for a short time. It is caused by a sudden decrease in blood flow to the brain.  · Signs that you may be about to faint include feeling dizzy, light-headed, or sick to your stomach, seeing all white or all black, or having cold, clammy skin.  · If you start to feel like you might pass out, lie down right away and raise (elevate) your feet above the level of your heart. Breathe deeply and steadily. Wait until all of the symptoms are gone.  This information is not intended to replace advice given to you by your health care provider. Make sure you discuss any questions you have with your health care provider.  Document Released: 06/05/2009 Document Revised: 01/30/2019 Document Reviewed: 01/30/2019  [employer] Patient Education © 2020 [employer]    FOLLOW UP ITEMS POST DISCHARGE  Please call [phone] to schedule PCP appointment for patient.    Required specialty appointments include:       Discharge Instructions per MATIAS Qiu    -Establish with a new PCP and follow-up as indicated  -HOLD all blood pressure medication (amlodipine and lisinopril)   -Monitor blood pressure twice daily (morning and before bed)  -If SBP is less than 90 mmHg,  make sure you are hydrating  -If SBP is more than 140 mmHg, utilize prescribed antihypertensive medication (amlodipine only)  -Resume all other home medication  -Continue weight loss program  -Remove 1 staple at the back of the head in 10 days 4/18/2022    DIET: Low-fat, low sugar, low sodium    ACTIVITY: As tolerated    DIAGNOSIS: Syncopal episode    Return to ER if symptoms persist, chest pain, palpitations, shortness of breath, numbness, tingling, weakness, and high fevers.

## 2022-04-08 NOTE — PROGRESS NOTES
[facility] Medicine Daily Progress Note    Date of Service  4/7/2022    Chief Complaint  Shital Hemphill is a 76 y.o. female admitted 4/6/2022 with syncope.     Interval Problem Update  Patient was seen and examined at bedside.  No acute events overnight. Patient is resting comfortably in bed and in no acute distress.     Telemetry  Echo  Ok to discharge if no telemetry events, normal echo    Code Status  Full Code    Review of Systems  Review of Systems   Constitutional: Negative for chills and fever.   HENT: Negative for hearing loss and tinnitus.    Eyes: Negative for blurred vision and double vision.   Respiratory: Negative for cough, shortness of breath and wheezing.    Cardiovascular: Negative for chest pain, palpitations and leg swelling.   Gastrointestinal: Negative for abdominal pain, heartburn, nausea and vomiting.   Genitourinary: Negative for dysuria and flank pain.   Musculoskeletal: Negative for falls and myalgias.   Skin: Negative for itching and rash.   Neurological: Positive for loss of consciousness. Negative for dizziness, tingling, tremors, sensory change, speech change, focal weakness, seizures, weakness and headaches.   Endo/Heme/Allergies: Negative for environmental allergies. Does not bruise/bleed easily.   Psychiatric/Behavioral: Negative for depression and substance abuse.   All other systems reviewed and are negative.       Physical Exam  Temp:  [36.3 °C (97.3 °F)-36.6 °C (97.9 °F)] 36.3 °C (97.3 °F)  Pulse:  [] 73  Resp:  [13-38] 18  BP: ()/(56-90) 125/67  SpO2:  [91 %-98 %] 96 %    Physical Exam  Vitals and nursing note reviewed.   Constitutional:       Appearance: She is obese.   HENT:      Head: Normocephalic and atraumatic.      Nose: Nose normal.      Mouth/Throat:      Mouth: Mucous membranes are moist.      Pharynx: Oropharynx is clear.   Eyes:      General: No scleral icterus.     Extraocular Movements: Extraocular movements intact.   Cardiovascular:      Rate and Rhythm:  Normal rate and regular rhythm.      Pulses: Normal pulses.      Heart sounds:     No friction rub.   Pulmonary:      Effort: Pulmonary effort is normal. No respiratory distress.      Breath sounds: No wheezing.   Abdominal:      General: There is no distension.      Palpations: Abdomen is soft.      Tenderness: There is no abdominal tenderness. There is no guarding or rebound.   Musculoskeletal:         General: No swelling or tenderness. Normal range of motion.      Cervical back: Neck supple. No tenderness.   Skin:     General: Skin is warm and dry.      Capillary Refill: Capillary refill takes less than 2 seconds.   Neurological:      General: No focal deficit present.      Mental Status: She is alert and oriented to person, place, and time.      Motor: No weakness.   Psychiatric:         Mood and Affect: Mood normal.         Fluids  No intake or output data in the 24 hours ending 04/07/22 1725    Laboratory  Recent Labs     04/06/22  1900 04/07/22  0319   WBC 9.6 9.2   RBC 4.48 4.39   HEMOGLOBIN 14.1 13.9   HEMATOCRIT 42.9 41.4   MCV 95.8 94.3   MCH 31.5 31.7   MCHC 32.9* 33.6   RDW 49.7 49.2   PLATELETCT 289 285   MPV 9.0 9.0     Recent Labs     04/06/22  1900 04/07/22  0319   SODIUM 131* 137   POTASSIUM 3.9 4.1   CHLORIDE 98 103   CO2 19* 21   GLUCOSE 149* 101*   BUN 21 20   CREATININE 0.63 0.58   CALCIUM 9.5 9.5                   Imaging  DX-CHEST-PORTABLE (1 VIEW)   Final Result         1. No acute cardiopulmonary abnormalities are identified.      CT-HEAD W/O   Final Result         1. No acute intracranial abnormality. No evidence of acute intracranial hemorrhage or mass lesion.                     CT-CSPINE WITHOUT PLUS RECONS   Final Result         1. No acute fracture from C1 through T1 is visualized.         EC-ECHOCARDIOGRAM COMPLETE W/O CONT    (Results Pending)        Assessment/Plan  * Syncope and collapse- (present on admission)  Assessment & Plan  Probable orthostatic or  vasovagal/neurocardiogenic  No acute etiology seen on CT head or CT C-spine  Orthostatic VS  Telemetry monitoring  Follow-up echo    Hyperglycemia  Assessment & Plan  F/u HbA1c    Hyponatremia  Assessment & Plan  Mild, monitor    Occipital scalp laceration  Assessment & Plan  Status post lidocaine injection and suture repair    Gout  Assessment & Plan  Allopurinol    HTN (hypertension)  Assessment & Plan  Home meds    ACP (advance care planning)  Assessment & Plan  D/w pt and pt's caregiver daughter in ER -- FULL code per pt's wish  ACP: 16mins         VTE prophylaxis: SCDs/TEDs and enoxaparin ppx    I have performed a physical exam and reviewed and updated ROS and Plan today (4/7/2022). In review of yesterday's note (4/6/2022), there are no changes except as documented above.

## 2022-04-08 NOTE — DISCHARGE SUMMARY
Discharge Summary    CHIEF COMPLAINT ON ADMISSION  Chief Complaint   Patient presents with   • Syncope     Approx 45 mins ago. Pt sitting down and lost consciousness. Witnessed by daughter. Daughter says pt was down for 2-3 mins. +Head trauma. Denies chest pain. Denies dizziness. Pt says this is the first time this has happened.    • Head Injury     Pt hit back of head during fall backwards. Blood to injury.        Reason for Admission  TBI     Admission Date  4/6/2022    CODE STATUS  Full Code    HPI & HOSPITAL COURSE    Ms. Shital Hemphill is a 76-year-old female with a PMHx of HTN who presented on 4//2022 due to a syncopal episode.    Patient was talking to her daughter when the syncopal episode happened and was sitting on a chair.  All of a sudden, patient notes that the room was darkening and fell backwards and hit her head.  EMS was called and patient was brought to the hospital.    Patient arrived in ER for TBI with no acute etiology seen on CT head and CT of C-spine.  Patient denies any tongue biting, no loss of urinary or bladder incontinence, no focal neurological deficits.  Initial troponin T was within normal limits.  Initial EKG noted nonspecific ST changes.  Patient admitted into the observation unit for further evaluation and treatment.    During his hospital stay, an updated echocardiogram was obtained which noted normal left ventricular chamber size, left ventricle ejection fraction is visually estimated at 55% with normal inferior vena cava size, normal right ventricular systolic pressure at 35 mmHg.    Patient seen and examined.  Discussed with patient and family who are at bedside regarding CT imaging results along with echocardiogram results.  Patient noted to be hypotensive throughout hospital stay.  Patient given 500 mL saline bolus.  Patient instructed to HOLD all blood pressure medication until seen by primary care physician.  Patient instructed to keep self hydrated at all times.  Patient to  check blood pressure twice daily.  If SBP is less than 90 mmHg, ensure patient is hydrating, if SBP is greater than 140 mmHg, utilize antihypertensive medication.  Discussed changes with patient and family.  All questions and concerns answered prior to discharge.  Patient discharged home.      Therefore, she is discharged in good and stable condition to home with close outpatient follow-up.    The patient recovered much more quickly than anticipated on admission.    Discharge Date  04/08/22      FOLLOW UP ITEMS POST DISCHARGE  Please call [phone] to schedule PCP appointment for patient.    Required specialty appointments include:       Discharge Instructions per MATIAS Qiu    -Establish with a new PCP and follow-up as indicated  -HOLD all blood pressure medication (amlodipine and lisinopril)   -Monitor blood pressure twice daily (morning and before bed)  -If SBP is less than 90 mmHg, make sure you are hydrating  -If SBP is more than 140 mmHg, utilize prescribed antihypertensive medication (amlodipine only)  -Resume all other home medication  -Continue weight loss program  -Remove 1 staple at the back of the head in 10 days 4/18/2022    DIET: Low-fat, low sugar, low sodium    ACTIVITY: As tolerated    DIAGNOSIS: Syncopal episode    Return to ER if symptoms persist, chest pain, palpitations, shortness of breath, numbness, tingling, weakness, and high fevers.      DISCHARGE DIAGNOSES  Principal Problem (Resolved):    Syncope and collapse POA: Yes  Active Problems:    ACP (advance care planning) POA: Unknown    HTN (hypertension) POA: Unknown    Gout POA: Unknown    Occipital scalp laceration POA: Unknown    Hyperglycemia POA: Unknown  Resolved Problems:    Hyponatremia POA: Unknown      FOLLOW UP    [facility]  [address] [place] [id number]  [phone]  Schedule an appointment as soon as possible for a visit in 1 week        MEDICATIONS ON DISCHARGE     Medication  List      CONTINUE taking these medications      Instructions   allopurinol 300 MG Tabs  Commonly known as: ZYLOPRIM   Take 300 mg by mouth every day.  Dose: 300 mg     aspirin EC 81 MG Tbec  Commonly known as: ECOTRIN   Take 81 mg by mouth every day.  Dose: 81 mg     CENTRUM PO   Take 1 Tablet by mouth every day.  Dose: 1 Tablet     cetirizine 10 MG Tabs  Commonly known as: ZYRTEC   Take 10 mg by mouth every day.  Dose: 10 mg     GLUCOSAMINE PO   Take 1 Tablet by mouth 2 times a day.  Dose: 1 Tablet     meloxicam 7.5 MG Tabs  Commonly known as: MOBIC   Take 15 mg by mouth as needed for Moderate Pain.  Dose: 15 mg        STOP taking these medications    amLODIPine 5 MG Tabs  Commonly known as: NORVASC     losartan 100 MG Tabs  Commonly known as: COZAAR            Allergies  No Known Allergies    DIET  Orders Placed This Encounter   Procedures   • Diet Order Diet: Cardiac     Standing Status:   Standing     Number of Occurrences:   1     Order Specific Question:   Diet:     Answer:   Cardiac [6]       ACTIVITY  As tolerated.  Weight bearing as tolerated    CONSULTATIONS  NONE    PROCEDURES  NONE    IMAGING  EC-ECHOCARDIOGRAM COMPLETE W/O CONT   Final Result      DX-CHEST-PORTABLE (1 VIEW)   Final Result         1. No acute cardiopulmonary abnormalities are identified.      CT-HEAD W/O   Final Result         1. No acute intracranial abnormality. No evidence of acute intracranial hemorrhage or mass lesion.                     CT-CSPINE WITHOUT PLUS RECONS   Final Result         1. No acute fracture from C1 through T1 is visualized.               LABORATORY  Lab Results   Component Value Date    SODIUM 137 04/07/2022    POTASSIUM 4.1 04/07/2022    CHLORIDE 103 04/07/2022    CO2 21 04/07/2022    GLUCOSE 101 (H) 04/07/2022    BUN 20 04/07/2022    CREATININE 0.58 04/07/2022        Lab Results   Component Value Date    WBC 9.2 04/07/2022    HEMOGLOBIN 13.9 04/07/2022    HEMATOCRIT 41.4 04/07/2022    PLATELETCT 285  04/07/2022        Total time of the discharge process exceeds 36 minutes.    ============================================================================================================  Please note that this dictation was created using voice recognition software. I have made every reasonable attempt to correct obvious errors, but there may be errors of grammar and possibly content that I did not discover before finalizing the note.    Electronically signed by:  NICOLE Nava, MSN, APRN, FNP-C  Hospitalist Services  [facility]  [phone]  [email or web address]  04/08/22                 1019

## 2022-04-08 NOTE — PROGRESS NOTES
Discharge instructions, medications and follow-up reviewed with pt, pt verbalized understanding and denies questions. Discharge paperwork given to pt. PIV removed, armband removed. Pt transported off unit via wheelchair with hospital escort.

## 2022-04-13 ENCOUNTER — OFFICE VISIT (OUTPATIENT)
Dept: MEDICAL GROUP | Facility: MEDICAL CENTER | Age: 77
End: 2022-04-13
Attending: NURSE PRACTITIONER
Payer: MEDICAID

## 2022-04-13 ENCOUNTER — PATIENT OUTREACH (OUTPATIENT)
Dept: HEALTH INFORMATION MANAGEMENT | Facility: OTHER | Age: 77
End: 2022-04-13

## 2022-04-13 VITALS
SYSTOLIC BLOOD PRESSURE: 118 MMHG | HEIGHT: 64 IN | BODY MASS INDEX: 36.26 KG/M2 | HEART RATE: 83 BPM | OXYGEN SATURATION: 95 % | WEIGHT: 212.4 LBS | RESPIRATION RATE: 17 BRPM | TEMPERATURE: 97 F | DIASTOLIC BLOOD PRESSURE: 80 MMHG

## 2022-04-13 DIAGNOSIS — Z76.89 ENCOUNTER TO ESTABLISH CARE: ICD-10-CM

## 2022-04-13 DIAGNOSIS — I10 PRIMARY HYPERTENSION: ICD-10-CM

## 2022-04-13 DIAGNOSIS — M10.069 IDIOPATHIC GOUT OF KNEE, UNSPECIFIED CHRONICITY, UNSPECIFIED LATERALITY: ICD-10-CM

## 2022-04-13 DIAGNOSIS — Z13.820 OSTEOPOROSIS SCREENING: ICD-10-CM

## 2022-04-13 PROCEDURE — 99214 OFFICE O/P EST MOD 30 MIN: CPT | Performed by: NURSE PRACTITIONER

## 2022-04-13 PROCEDURE — 99213 OFFICE O/P EST LOW 20 MIN: CPT | Performed by: NURSE PRACTITIONER

## 2022-04-13 PROCEDURE — 99203 OFFICE O/P NEW LOW 30 MIN: CPT | Performed by: NURSE PRACTITIONER

## 2022-04-13 RX ORDER — ALLOPURINOL 300 MG/1
300 TABLET ORAL DAILY
Qty: 30 TABLET | Refills: 1 | Status: SHIPPED | OUTPATIENT
Start: 2022-04-13 | End: 2022-05-09

## 2022-04-13 RX ORDER — ALLOPURINOL 300 MG/1
300 TABLET ORAL DAILY
Qty: 30 TABLET | Refills: 1 | Status: SHIPPED | OUTPATIENT
Start: 2022-04-13 | End: 2022-04-13 | Stop reason: SDUPTHER

## 2022-04-13 RX ORDER — MELOXICAM 7.5 MG/1
15 TABLET ORAL PRN
Qty: 30 TABLET | Refills: 1 | Status: SHIPPED | OUTPATIENT
Start: 2022-04-13 | End: 2022-06-01

## 2022-04-13 RX ORDER — MELOXICAM 7.5 MG/1
15 TABLET ORAL PRN
Qty: 30 TABLET | Refills: 1 | Status: SHIPPED | OUTPATIENT
Start: 2022-04-13 | End: 2022-04-13 | Stop reason: SDUPTHER

## 2022-04-13 ASSESSMENT — FIBROSIS 4 INDEX: FIB4 SCORE: 1.5

## 2022-04-13 NOTE — PROGRESS NOTES
Chief Complaint   Patient presents with   • Establish Care       Subjective:     HPI:   Shital Hemphill is a 76 y.o. female here to discuss the evaluation and management of:        Problem   Encounter to Establish Care    Patient here to establish care.  Patient was recently hospitalized secondary to syncopal episode causing head trauma.  EKG, echocardiogram and head CT were completed during admission with no acute findings.  Patient was noted to be orthostatic, and hypertensive.  Patient was given several IV fluid boluses, and instructed to hold her antihypertensives.     Htn (Hypertension)    Per patient and patient's daughter patient has been on losartan and amlodipine for 20 to 40 years, however when recently hospitalized patient was taken off these medications as she was hypotensive.  Patient has lost approximately 50 pounds in the last 4 months secondary to diet changes with her daughter.  Patient has been off of her antihypertensive medications since discharge from the hospital and has provided list of home blood pressure readings.  Reviewing home blood pressure log there are no systolic blood pressures over 140 with the majority of them being in the low high 110s to low 120s systolically.  Patient is still feeling slightly dizzy at times.         ROS  See HPI       No Known Allergies    Current medicines (including changes today)  Current Outpatient Medications   Medication Sig Dispense Refill   • allopurinol (ZYLOPRIM) 300 MG Tab Take 1 Tablet by mouth every day. 30 Tablet 1   • meloxicam (MOBIC) 7.5 MG Tab Take 2 Tablets by mouth as needed for Moderate Pain. 30 Tablet 1   • Multiple Vitamins-Minerals (CENTRUM PO) Take 1 Tablet by mouth every day.     • aspirin EC (ECOTRIN) 81 MG Tablet Delayed Response Take 81 mg by mouth every day.     • Glucosamine HCl (GLUCOSAMINE PO) Take 1 Tablet by mouth 2 times a day.     • cetirizine (ZYRTEC) 10 MG Tab Take 10 mg by mouth every day.       No current  "facility-administered medications for this visit.       Social History     Tobacco Use   • Smoking status: Never Smoker   • Smokeless tobacco: Never Used   Vaping Use   • Vaping Use: Never used   Substance Use Topics   • Alcohol use: Not Currently   • Drug use: Not Currently       Patient Active Problem List    Diagnosis Date Noted   • Encounter to establish care 04/13/2022   • ACP (advance care planning) 04/06/2022   • HTN (hypertension) 04/06/2022   • Gout 04/06/2022   • Occipital scalp laceration 04/06/2022   • Hyperglycemia 04/06/2022       Family History   Problem Relation Age of Onset   • Hypertension Mother           Objective:     /80 (BP Location: Left arm, Patient Position: Sitting, BP Cuff Size: Adult)   Pulse 83   Temp 36.1 °C (97 °F) (Skin)   Resp 17   Ht 1.626 m (5' 4\")   Wt 96.3 kg (212 lb 6.4 oz)   SpO2 95%  Body mass index is 36.46 kg/m².    Physical Exam:  Physical Exam  Vitals reviewed.   Constitutional:       General: She is awake.      Appearance: Normal appearance. She is well-developed.   HENT:      Head: Normocephalic.   Eyes:      Conjunctiva/sclera: Conjunctivae normal.   Cardiovascular:      Rate and Rhythm: Normal rate and regular rhythm.      Heart sounds: Murmur heard.   Pulmonary:      Effort: Pulmonary effort is normal. No respiratory distress.      Breath sounds: Normal breath sounds. No wheezing.   Musculoskeletal:      Cervical back: Neck supple.   Skin:     General: Skin is warm and dry.   Neurological:      Mental Status: She is alert and oriented to person, place, and time.   Psychiatric:         Mood and Affect: Mood normal.         Behavior: Behavior normal. Behavior is cooperative.         Assessment and Plan:     The following treatment plan was discussed:    Problem List Items Addressed This Visit     HTN (hypertension)     Ongoing-  We will continue to hold patient's previous antihypertensive medications as it appears she is no longer requiring them with her " recent weight loss.   Encourage patient to stay well-hydrated to avoid hypotensive episodes and further syncope  We will have patient continue to check her blood pressure at home once or twice daily at different times and bring log to follow-up.  We will see patient in 4 weeks         Gout    Relevant Medications    allopurinol (ZYLOPRIM) 300 MG Tab    meloxicam (MOBIC) 7.5 MG Tab    Encounter to establish care     Discussed health history and maintenance   Flu vaccine - Not available   Colon Ca screening - Not applicable   Mammogram- Not Applicable   Pap smear - Aged out   Preventative screening labs -to be ordered in about 6 months as patient is just screened in the hospital from recent admission             Other Visit Diagnoses     Osteoporosis screening        Relevant Orders    DS-BONE DENSITY STUDY (DEXA)          Any change or worsening of signs or symptoms, patient encouraged to follow-up or report to emergency room for further evaluation. Patient verbalizes understanding and agrees.    Follow-Up: Return in about 4 weeks (around 5/11/2022) for Follow up BP check with log.      PLEASE NOTE: This dictation was created using voice recognition software. I have made every reasonable attempt to correct obvious errors, but I expect that there are errors of grammar and possibly content that I did not discover before finalizing the note.

## 2022-04-13 NOTE — ASSESSMENT & PLAN NOTE
Discussed health history and maintenance   Flu vaccine - Not available   Colon Ca screening - Not applicable   Mammogram- Not Applicable   Pap smear - Aged out   Preventative screening labs -to be ordered in about 6 months as patient is just screened in the hospital from recent admission

## 2022-04-13 NOTE — PROGRESS NOTES
CHW Uriel called patient via TC and left a voicemail and introduced Community Care Management and provided all contact information.

## 2022-04-13 NOTE — ASSESSMENT & PLAN NOTE
Ongoing-  We will continue to hold patient's previous antihypertensive medications as it appears she is no longer requiring them with her recent weight loss.   Encourage patient to stay well-hydrated to avoid hypotensive episodes and further syncope  We will have patient continue to check her blood pressure at home once or twice daily at different times and bring log to follow-up.  We will see patient in 4 weeks

## 2022-04-15 ENCOUNTER — PATIENT OUTREACH (OUTPATIENT)
Dept: HEALTH INFORMATION MANAGEMENT | Facility: OTHER | Age: 77
End: 2022-04-15
Payer: MEDICAID

## 2022-04-15 NOTE — PROGRESS NOTES
CHW Uriel called patient via TC and left a voicemail introducing Community Care Management and provided all contact information.     04/18/22:  CHW Uriel called patient via TC and left a voicemail introducing Community Care Management and provided all contact information.   Third and final attempt. CHW Uriel will discharge patient from [facility] and remove from case load and master list due to lack of engagement.

## 2022-04-29 ENCOUNTER — HOSPITAL ENCOUNTER (OUTPATIENT)
Dept: RADIOLOGY | Facility: MEDICAL CENTER | Age: 77
End: 2022-04-29
Attending: NURSE PRACTITIONER
Payer: MEDICAID

## 2022-04-29 DIAGNOSIS — Z13.820 OSTEOPOROSIS SCREENING: ICD-10-CM

## 2022-04-29 PROCEDURE — 77080 DXA BONE DENSITY AXIAL: CPT

## 2022-05-09 DIAGNOSIS — M10.069 IDIOPATHIC GOUT OF KNEE, UNSPECIFIED CHRONICITY, UNSPECIFIED LATERALITY: ICD-10-CM

## 2022-05-09 RX ORDER — ALLOPURINOL 300 MG/1
300 TABLET ORAL DAILY
Qty: 30 TABLET | Refills: 0 | Status: SHIPPED | OUTPATIENT
Start: 2022-05-09 | End: 2022-06-03

## 2022-05-11 ENCOUNTER — OFFICE VISIT (OUTPATIENT)
Dept: MEDICAL GROUP | Facility: MEDICAL CENTER | Age: 77
End: 2022-05-11
Attending: NURSE PRACTITIONER
Payer: MEDICAID

## 2022-05-11 VITALS
HEART RATE: 77 BPM | TEMPERATURE: 96.6 F | RESPIRATION RATE: 16 BRPM | OXYGEN SATURATION: 95 % | HEIGHT: 63 IN | DIASTOLIC BLOOD PRESSURE: 86 MMHG | SYSTOLIC BLOOD PRESSURE: 138 MMHG | WEIGHT: 203.6 LBS | BODY MASS INDEX: 36.07 KG/M2

## 2022-05-11 DIAGNOSIS — F41.1 ANXIETY ASSOCIATED WITH DYING PROCESS: ICD-10-CM

## 2022-05-11 DIAGNOSIS — G89.29 CHRONIC PAIN OF BOTH KNEES: ICD-10-CM

## 2022-05-11 DIAGNOSIS — M25.562 CHRONIC PAIN OF BOTH KNEES: ICD-10-CM

## 2022-05-11 DIAGNOSIS — I10 PRIMARY HYPERTENSION: ICD-10-CM

## 2022-05-11 DIAGNOSIS — M25.561 CHRONIC PAIN OF BOTH KNEES: ICD-10-CM

## 2022-05-11 PROCEDURE — 99212 OFFICE O/P EST SF 10 MIN: CPT | Performed by: NURSE PRACTITIONER

## 2022-05-11 PROCEDURE — 99214 OFFICE O/P EST MOD 30 MIN: CPT | Performed by: NURSE PRACTITIONER

## 2022-05-11 ASSESSMENT — FIBROSIS 4 INDEX: FIB4 SCORE: 1.5

## 2022-05-11 NOTE — ASSESSMENT & PLAN NOTE
Controlled-  We will continue to stay off of antihypertensive medications as patient's blood pressure appears to have become more controlled with her recent 50 pound weight loss.  Daughter will continue to keep a log of daily blood pressures taken at different times during the day  We will follow-up in 3 months unless her blood pressure starts increasing prior

## 2022-05-11 NOTE — ASSESSMENT & PLAN NOTE
Chronic condition-  Referral to orthopedics to obtain steroid injections to knees  Encouraged to continue to take the meloxicam for pain  Provided Voltaren gel to see if this will help with pain as well

## 2022-05-11 NOTE — PROGRESS NOTES
Chief Complaint   Patient presents with   • Follow-Up     Imaging, medications, blood pressure       Subjective:     HPI:   Shital Hemphill is a 76 y.o. female here to discuss the evaluation and management of:      Problem   Anxiety Associated With Dying Process    Patient has been under an extreme amount of stress as her  of many many years was just diagnosed with cancer and they are waiting for full diagnostic work-up so they may understand the extent and treatment options.  Daughter is in clinic with patient and feels that maybe some of therapy would be helpful with this new diagnosis and unknown outcome.     Chronic Pain of Both Knees    Patient states she saw orthopedic years ago and was receiving steroid injections to both knees every 6 months.  She states that they had offered to have knee replacement surgery but she was not excited about that option and decided to stay with the biannual injections into her knees.  Patient states that this stopped during the pandemic as she did not want to leave the home, however now the pain is coming back more significantly in her knees and she would like to return to having the injections so she may be able to ambulate without pain.     Htn (Hypertension)    Patient here for follow-up, has been taking home blood pressure readings and keeping a log.  Daughter provided log which shows well-controlled blood pressure with most readings 140 or below systolically and only 1 reading in the last few weeks of 160 systolically.  Patient states that reading was taken after she had not been able to sleep all night and was under significant amount of stress.  Patient states no further episodes of dizziness or lightheadedness.  Patient has been off her blood pressure medications since discharge from the hospital.         ROS  See HPI     No Known Allergies    Current medicines (including changes today)  Current Outpatient Medications   Medication Sig Dispense Refill   • allopurinol  "(ZYLOPRIM) 300 MG Tab TAKE 1 TABLET BY MOUTH EVERY DAY 30 Tablet 0   • meloxicam (MOBIC) 7.5 MG Tab Take 2 Tablets by mouth as needed for Moderate Pain. 30 Tablet 1   • Multiple Vitamins-Minerals (CENTRUM PO) Take 1 Tablet by mouth every day.     • aspirin EC (ECOTRIN) 81 MG Tablet Delayed Response Take 81 mg by mouth every day.     • Glucosamine HCl (GLUCOSAMINE PO) Take 1 Tablet by mouth 2 times a day.     • cetirizine (ZYRTEC) 10 MG Tab Take 10 mg by mouth every day.       No current facility-administered medications for this visit.       Social History     Tobacco Use   • Smoking status: Never Smoker   • Smokeless tobacco: Never Used   Vaping Use   • Vaping Use: Never used   Substance Use Topics   • Alcohol use: Not Currently   • Drug use: Not Currently       Patient Active Problem List    Diagnosis Date Noted   • Anxiety associated with dying process 05/11/2022   • Chronic pain of both knees 05/11/2022   • Encounter to establish care 04/13/2022   • ACP (advance care planning) 04/06/2022   • HTN (hypertension) 04/06/2022   • Gout 04/06/2022   • Occipital scalp laceration 04/06/2022   • Hyperglycemia 04/06/2022       Family History   Problem Relation Age of Onset   • Hypertension Mother           Objective:     /86 (BP Location: Right arm, Patient Position: Sitting, BP Cuff Size: Adult)   Pulse 77   Temp 35.9 °C (96.6 °F) (Temporal)   Resp 16   Ht 1.6 m (5' 3\")   Wt 92.4 kg (203 lb 9.6 oz)   SpO2 95%  Body mass index is 36.07 kg/m².    Physical Exam:  Physical Exam  Vitals reviewed.   Constitutional:       General: She is awake.      Appearance: Normal appearance. She is well-developed.   HENT:      Head: Normocephalic.   Eyes:      Conjunctiva/sclera: Conjunctivae normal.   Cardiovascular:      Rate and Rhythm: Normal rate.   Pulmonary:      Effort: Pulmonary effort is normal. No respiratory distress.   Musculoskeletal:      Cervical back: Neck supple.   Skin:     General: Skin is warm and dry. "   Neurological:      Mental Status: She is alert and oriented to person, place, and time.   Psychiatric:         Mood and Affect: Mood normal.         Behavior: Behavior normal. Behavior is cooperative.              Assessment and Plan:     The following treatment plan was discussed:    Problem List Items Addressed This Visit     HTN (hypertension)     Controlled-  We will continue to stay off of antihypertensive medications as patient's blood pressure appears to have become more controlled with her recent 50 pound weight loss.  Daughter will continue to keep a log of daily blood pressures taken at different times during the day  We will follow-up in 3 months unless her blood pressure starts increasing prior           Anxiety associated with dying process     New complaint-  Referral to psychology to help with cancer diagnosis and uncertain outcome           Relevant Orders    Referral to Psychology    Chronic pain of both knees     Chronic condition-  Referral to orthopedics to obtain steroid injections to knees  Encouraged to continue to take the meloxicam for pain  Provided Voltaren gel to see if this will help with pain as well           Relevant Orders    Referral to Orthopedics          Any change or worsening of signs or symptoms, patient encouraged to follow-up or report to emergency room for further evaluation. Patient verbalizes understanding and agrees.    Follow-Up: Return in about 3 months (around 8/11/2022) for Follow up BP check with log.      PLEASE NOTE: This dictation was created using voice recognition software. I have made every reasonable attempt to correct obvious errors, but I expect that there are errors of grammar and possibly content that I did not discover before finalizing the note.

## 2022-06-01 DIAGNOSIS — M10.069 IDIOPATHIC GOUT OF KNEE, UNSPECIFIED CHRONICITY, UNSPECIFIED LATERALITY: ICD-10-CM

## 2022-06-01 RX ORDER — MELOXICAM 7.5 MG/1
15 TABLET ORAL PRN
Qty: 30 TABLET | Refills: 1 | Status: SHIPPED | OUTPATIENT
Start: 2022-06-01 | End: 2022-07-05

## 2022-06-01 NOTE — TELEPHONE ENCOUNTER
Received request via: Pharmacy    Was the patient seen in the last year in this department? Yes    Does the patient have an active prescription (recently filled or refills available) for medication(s) requested? No     Last visit 5/11/22

## 2022-06-03 DIAGNOSIS — M10.069 IDIOPATHIC GOUT OF KNEE, UNSPECIFIED CHRONICITY, UNSPECIFIED LATERALITY: ICD-10-CM

## 2022-06-03 RX ORDER — ALLOPURINOL 300 MG/1
300 TABLET ORAL DAILY
Qty: 30 TABLET | Refills: 0 | Status: SHIPPED | OUTPATIENT
Start: 2022-06-03 | End: 2022-06-29

## 2022-06-29 DIAGNOSIS — M10.069 IDIOPATHIC GOUT OF KNEE, UNSPECIFIED CHRONICITY, UNSPECIFIED LATERALITY: ICD-10-CM

## 2022-06-29 RX ORDER — ALLOPURINOL 300 MG/1
300 TABLET ORAL DAILY
Qty: 30 TABLET | Refills: 0 | Status: SHIPPED | OUTPATIENT
Start: 2022-06-29 | End: 2022-08-15 | Stop reason: SDUPTHER

## 2022-07-04 DIAGNOSIS — M10.069 IDIOPATHIC GOUT OF KNEE, UNSPECIFIED CHRONICITY, UNSPECIFIED LATERALITY: ICD-10-CM

## 2022-07-05 RX ORDER — MELOXICAM 7.5 MG/1
15 TABLET ORAL PRN
Qty: 30 TABLET | Refills: 1 | Status: SHIPPED | OUTPATIENT
Start: 2022-07-05 | End: 2022-08-03

## 2022-08-02 DIAGNOSIS — M10.069 IDIOPATHIC GOUT OF KNEE, UNSPECIFIED CHRONICITY, UNSPECIFIED LATERALITY: ICD-10-CM

## 2022-08-03 RX ORDER — MELOXICAM 7.5 MG/1
15 TABLET ORAL PRN
Qty: 30 TABLET | Refills: 1 | Status: SHIPPED | OUTPATIENT
Start: 2022-08-03 | End: 2022-08-04

## 2022-08-04 DIAGNOSIS — M10.069 IDIOPATHIC GOUT OF KNEE, UNSPECIFIED CHRONICITY, UNSPECIFIED LATERALITY: ICD-10-CM

## 2022-08-04 RX ORDER — MELOXICAM 7.5 MG/1
7.5-15 TABLET ORAL
Qty: 30 TABLET | Refills: 1 | Status: SHIPPED | OUTPATIENT
Start: 2022-08-04

## 2022-08-15 DIAGNOSIS — M10.069 IDIOPATHIC GOUT OF KNEE, UNSPECIFIED CHRONICITY, UNSPECIFIED LATERALITY: ICD-10-CM

## 2022-08-16 RX ORDER — ALLOPURINOL 300 MG/1
300 TABLET ORAL DAILY
Qty: 30 TABLET | Refills: 0 | Status: SHIPPED | OUTPATIENT
Start: 2022-08-16 | End: 2022-09-14 | Stop reason: SDUPTHER

## 2022-09-14 DIAGNOSIS — M10.069 IDIOPATHIC GOUT OF KNEE, UNSPECIFIED CHRONICITY, UNSPECIFIED LATERALITY: ICD-10-CM

## 2022-09-16 RX ORDER — ALLOPURINOL 300 MG/1
300 TABLET ORAL DAILY
Qty: 30 TABLET | Refills: 0 | Status: SHIPPED | OUTPATIENT
Start: 2022-09-16

## 2023-03-10 PROBLEM — M17.11 TRICOMPARTMENT OSTEOARTHRITIS OF RIGHT KNEE: Status: ACTIVE | Noted: 2023-03-10

## 2023-03-13 ASSESSMENT — FIBROSIS 4 INDEX: FIB4 SCORE: 1.52

## 2023-03-23 ENCOUNTER — APPOINTMENT (OUTPATIENT)
Dept: ADMISSIONS | Facility: MEDICAL CENTER | Age: 78
End: 2023-03-23
Attending: ORTHOPAEDIC SURGERY
Payer: MEDICARE

## 2023-03-28 ENCOUNTER — PRE-ADMISSION TESTING (OUTPATIENT)
Dept: ADMISSIONS | Facility: MEDICAL CENTER | Age: 78
End: 2023-03-28
Attending: ORTHOPAEDIC SURGERY
Payer: MEDICARE

## 2023-03-28 VITALS — HEIGHT: 64 IN | BODY MASS INDEX: 37.05 KG/M2

## 2023-03-28 RX ORDER — ACETAMINOPHEN 325 MG/1
650 TABLET ORAL EVERY 4 HOURS PRN
COMMUNITY

## 2023-03-28 NOTE — PREPROCEDURE INSTRUCTIONS
"Preadmit Phone appointment: \" Preparing for your Procedure information\" Instructions discussed with Patient.       Patient instructed to continue prescribed medications through the day before surgery, instructed to take the following medications the day of surgery per anesthesia protocol: none.        Pt states, \"no issues with anesthesia\".  Fasting guidelines discussed with Patient, patient will follow MD's instructions for Pre-Surgery Diet.      All Pt's questions and concerns answered or addressed.  Emailed all instructions. Labs ekg mrsa set up for 3/31/23.   "

## 2023-03-31 ENCOUNTER — PRE-ADMISSION TESTING (OUTPATIENT)
Dept: ADMISSIONS | Facility: MEDICAL CENTER | Age: 78
End: 2023-03-31
Attending: ORTHOPAEDIC SURGERY
Payer: MEDICARE

## 2023-03-31 DIAGNOSIS — Z01.810 PRE-OPERATIVE CARDIOVASCULAR EXAMINATION: ICD-10-CM

## 2023-03-31 DIAGNOSIS — Z01.812 PRE-OPERATIVE LABORATORY EXAMINATION: ICD-10-CM

## 2023-03-31 DIAGNOSIS — M17.11 ARTHRITIS OF RIGHT KNEE: ICD-10-CM

## 2023-03-31 LAB
ANION GAP SERPL CALC-SCNC: 12 MMOL/L (ref 7–16)
BUN SERPL-MCNC: 19 MG/DL (ref 8–22)
CALCIUM SERPL-MCNC: 9.6 MG/DL (ref 8.4–10.2)
CHLORIDE SERPL-SCNC: 98 MMOL/L (ref 96–112)
CO2 SERPL-SCNC: 26 MMOL/L (ref 20–33)
CREAT SERPL-MCNC: 0.52 MG/DL (ref 0.5–1.4)
EKG IMPRESSION: NORMAL
ERYTHROCYTE [DISTWIDTH] IN BLOOD BY AUTOMATED COUNT: 45.8 FL (ref 35.9–50)
GFR SERPLBLD CREATININE-BSD FMLA CKD-EPI: 95 ML/MIN/1.73 M 2
GLUCOSE SERPL-MCNC: 97 MG/DL (ref 65–99)
HCT VFR BLD AUTO: 44.7 % (ref 37–47)
HGB BLD-MCNC: 14.8 G/DL (ref 12–16)
MCH RBC QN AUTO: 31.7 PG (ref 27–33)
MCHC RBC AUTO-ENTMCNC: 33.1 G/DL (ref 33.6–35)
MCV RBC AUTO: 95.7 FL (ref 81.4–97.8)
PLATELET # BLD AUTO: 302 K/UL (ref 164–446)
PMV BLD AUTO: 8.8 FL (ref 9–12.9)
POTASSIUM SERPL-SCNC: 4.1 MMOL/L (ref 3.6–5.5)
RBC # BLD AUTO: 4.67 M/UL (ref 4.2–5.4)
SCCMEC + MECA PNL NOSE NAA+PROBE: NEGATIVE
SCCMEC + MECA PNL NOSE NAA+PROBE: NEGATIVE
SODIUM SERPL-SCNC: 136 MMOL/L (ref 135–145)
WBC # BLD AUTO: 7.9 K/UL (ref 4.8–10.8)

## 2023-03-31 PROCEDURE — 80048 BASIC METABOLIC PNL TOTAL CA: CPT

## 2023-03-31 PROCEDURE — 85027 COMPLETE CBC AUTOMATED: CPT

## 2023-03-31 PROCEDURE — 87641 MR-STAPH DNA AMP PROBE: CPT

## 2023-03-31 PROCEDURE — 87640 STAPH A DNA AMP PROBE: CPT

## 2023-03-31 PROCEDURE — 93005 ELECTROCARDIOGRAM TRACING: CPT

## 2023-03-31 PROCEDURE — 36415 COLL VENOUS BLD VENIPUNCTURE: CPT

## 2023-03-31 PROCEDURE — 93010 ELECTROCARDIOGRAM REPORT: CPT | Performed by: INTERNAL MEDICINE

## 2023-03-31 NOTE — DISCHARGE PLANNING
DISCHARGE PLANNING NOTE - TOTAL JOINT    Procedure: Procedure(s):  ARTHROPLASTY, KNEE, TOTAL  Procedure Date: 4/6/2023  Insurance: Payor: MEDICARE / Plan: MEDICARE PART B ONLY / Product Type: *No Product type* /    Equipment currently available at home?  front-wheel walker and shower chair  Steps into the home? 1  Steps within the home? 0  Toilet height? ADA  Type of shower? walk-in shower  Who will be with you during your recovery? daughter  Is Outpatient Physical Therapy set up after surgery? Yes  Did you take the Total Joint Class and where? Yes received link for classes and NAON book.  Planning same day discharge?No     This writer met with pt and her daughter, Franchesca, during her preadmission appt. Pt educated to preop showers, nasal mrsa swab and potential for overnight stay which pt prefers. Daughter, Franchesca, is also caregiver for pt's spouse who has cancer. [employer] coupon given to pt. Information for MTM transportation with medicaid given to pt. List of agencies for support care that take medicaid given to pt.Home safety checklist reviewed and copy given to pt. Pt educated to dc criteria. All questions answered and verbalizes understanding of all instructions. No dc needs identified at this time. Anticipate dc to home without barriers.

## 2023-04-05 ENCOUNTER — ANESTHESIA EVENT (OUTPATIENT)
Dept: SURGERY | Facility: MEDICAL CENTER | Age: 78
End: 2023-04-05
Payer: MEDICARE

## 2023-04-06 ENCOUNTER — APPOINTMENT (OUTPATIENT)
Dept: RADIOLOGY | Facility: MEDICAL CENTER | Age: 78
End: 2023-04-06
Attending: ORTHOPAEDIC SURGERY
Payer: MEDICARE

## 2023-04-06 ENCOUNTER — HOSPITAL ENCOUNTER (OUTPATIENT)
Facility: MEDICAL CENTER | Age: 78
End: 2023-04-08
Attending: ORTHOPAEDIC SURGERY | Admitting: ORTHOPAEDIC SURGERY
Payer: MEDICARE

## 2023-04-06 ENCOUNTER — ANESTHESIA (OUTPATIENT)
Dept: SURGERY | Facility: MEDICAL CENTER | Age: 78
End: 2023-04-06
Payer: MEDICARE

## 2023-04-06 DIAGNOSIS — M17.11 TRICOMPARTMENT OSTEOARTHRITIS OF RIGHT KNEE: ICD-10-CM

## 2023-04-06 PROBLEM — Z98.890 S/P LEFT KNEE SURGERY: Status: ACTIVE | Noted: 2023-04-06

## 2023-04-06 PROCEDURE — 160041 HCHG SURGERY MINUTES - EA ADDL 1 MIN LEVEL 4: Performed by: ORTHOPAEDIC SURGERY

## 2023-04-06 PROCEDURE — 73560 X-RAY EXAM OF KNEE 1 OR 2: CPT | Mod: RT

## 2023-04-06 PROCEDURE — 700101 HCHG RX REV CODE 250: Performed by: ORTHOPAEDIC SURGERY

## 2023-04-06 PROCEDURE — 700101 HCHG RX REV CODE 250: Performed by: ANESTHESIOLOGY

## 2023-04-06 PROCEDURE — 01402 ANES OPN/ARTH TOT KNE ARTHRP: CPT | Performed by: ANESTHESIOLOGY

## 2023-04-06 PROCEDURE — 700102 HCHG RX REV CODE 250 W/ 637 OVERRIDE(OP): Performed by: ANESTHESIOLOGY

## 2023-04-06 PROCEDURE — 700105 HCHG RX REV CODE 258: Performed by: ORTHOPAEDIC SURGERY

## 2023-04-06 PROCEDURE — 64447 NJX AA&/STRD FEMORAL NRV IMG: CPT | Mod: 59,RT | Performed by: ANESTHESIOLOGY

## 2023-04-06 PROCEDURE — 502000 HCHG MISC OR IMPLANTS RC 0278: Performed by: ORTHOPAEDIC SURGERY

## 2023-04-06 PROCEDURE — A9270 NON-COVERED ITEM OR SERVICE: HCPCS | Performed by: ORTHOPAEDIC SURGERY

## 2023-04-06 PROCEDURE — 99100 ANES PT EXTEME AGE<1 YR&>70: CPT | Performed by: ANESTHESIOLOGY

## 2023-04-06 PROCEDURE — G0378 HOSPITAL OBSERVATION PER HR: HCPCS

## 2023-04-06 PROCEDURE — A9270 NON-COVERED ITEM OR SERVICE: HCPCS | Performed by: ANESTHESIOLOGY

## 2023-04-06 PROCEDURE — 94760 N-INVAS EAR/PLS OXIMETRY 1: CPT

## 2023-04-06 PROCEDURE — 700102 HCHG RX REV CODE 250 W/ 637 OVERRIDE(OP): Performed by: ORTHOPAEDIC SURGERY

## 2023-04-06 PROCEDURE — 502240 HCHG MISC OR SUPPLY RC 0272: Performed by: ORTHOPAEDIC SURGERY

## 2023-04-06 PROCEDURE — 27447 TOTAL KNEE ARTHROPLASTY: CPT | Mod: RT | Performed by: ORTHOPAEDIC SURGERY

## 2023-04-06 PROCEDURE — 160009 HCHG ANES TIME/MIN: Performed by: ORTHOPAEDIC SURGERY

## 2023-04-06 PROCEDURE — 160029 HCHG SURGERY MINUTES - 1ST 30 MINS LEVEL 4: Performed by: ORTHOPAEDIC SURGERY

## 2023-04-06 PROCEDURE — 700111 HCHG RX REV CODE 636 W/ 250 OVERRIDE (IP): Performed by: ORTHOPAEDIC SURGERY

## 2023-04-06 PROCEDURE — 700111 HCHG RX REV CODE 636 W/ 250 OVERRIDE (IP): Performed by: ANESTHESIOLOGY

## 2023-04-06 PROCEDURE — C1776 JOINT DEVICE (IMPLANTABLE): HCPCS | Performed by: ORTHOPAEDIC SURGERY

## 2023-04-06 PROCEDURE — 160035 HCHG PACU - 1ST 60 MINS PHASE I: Performed by: ORTHOPAEDIC SURGERY

## 2023-04-06 PROCEDURE — 160048 HCHG OR STATISTICAL LEVEL 1-5: Performed by: ORTHOPAEDIC SURGERY

## 2023-04-06 PROCEDURE — 64447 NJX AA&/STRD FEMORAL NRV IMG: CPT | Performed by: ORTHOPAEDIC SURGERY

## 2023-04-06 PROCEDURE — 160002 HCHG RECOVERY MINUTES (STAT): Performed by: ORTHOPAEDIC SURGERY

## 2023-04-06 PROCEDURE — C1713 ANCHOR/SCREW BN/BN,TIS/BN: HCPCS | Performed by: ORTHOPAEDIC SURGERY

## 2023-04-06 PROCEDURE — 160036 HCHG PACU - EA ADDL 30 MINS PHASE I: Performed by: ORTHOPAEDIC SURGERY

## 2023-04-06 DEVICE — IMPLANTABLE DEVICE: Type: IMPLANTABLE DEVICE | Site: KNEE | Status: FUNCTIONAL

## 2023-04-06 DEVICE — CEMENT BONE SIMPLEX W/GENTAICIN (10/PK): Type: IMPLANTABLE DEVICE | Site: KNEE | Status: FUNCTIONAL

## 2023-04-06 RX ORDER — BUPIVACAINE HYDROCHLORIDE AND EPINEPHRINE 5; 5 MG/ML; UG/ML
INJECTION, SOLUTION EPIDURAL; INTRACAUDAL; PERINEURAL
Status: DISCONTINUED | OUTPATIENT
Start: 2023-04-06 | End: 2023-04-06 | Stop reason: HOSPADM

## 2023-04-06 RX ORDER — ONDANSETRON 2 MG/ML
4 INJECTION INTRAMUSCULAR; INTRAVENOUS EVERY 4 HOURS PRN
Status: DISCONTINUED | OUTPATIENT
Start: 2023-04-06 | End: 2023-04-08 | Stop reason: HOSPADM

## 2023-04-06 RX ORDER — AMOXICILLIN 250 MG
1 CAPSULE ORAL
Status: DISCONTINUED | OUTPATIENT
Start: 2023-04-06 | End: 2023-04-08 | Stop reason: HOSPADM

## 2023-04-06 RX ORDER — DEXAMETHASONE SODIUM PHOSPHATE 4 MG/ML
4 INJECTION, SOLUTION INTRA-ARTICULAR; INTRALESIONAL; INTRAMUSCULAR; INTRAVENOUS; SOFT TISSUE
Status: DISCONTINUED | OUTPATIENT
Start: 2023-04-06 | End: 2023-04-08 | Stop reason: HOSPADM

## 2023-04-06 RX ORDER — AMOXICILLIN 250 MG
1 CAPSULE ORAL NIGHTLY
Status: DISCONTINUED | OUTPATIENT
Start: 2023-04-06 | End: 2023-04-08 | Stop reason: HOSPADM

## 2023-04-06 RX ORDER — HYDROMORPHONE HYDROCHLORIDE 1 MG/ML
0.1 INJECTION, SOLUTION INTRAMUSCULAR; INTRAVENOUS; SUBCUTANEOUS
Status: DISCONTINUED | OUTPATIENT
Start: 2023-04-06 | End: 2023-04-06 | Stop reason: HOSPADM

## 2023-04-06 RX ORDER — HYDROMORPHONE HYDROCHLORIDE 1 MG/ML
0.2 INJECTION, SOLUTION INTRAMUSCULAR; INTRAVENOUS; SUBCUTANEOUS
Status: DISCONTINUED | OUTPATIENT
Start: 2023-04-06 | End: 2023-04-06 | Stop reason: HOSPADM

## 2023-04-06 RX ORDER — CEFAZOLIN SODIUM 1 G/3ML
INJECTION, POWDER, FOR SOLUTION INTRAMUSCULAR; INTRAVENOUS PRN
Status: DISCONTINUED | OUTPATIENT
Start: 2023-04-06 | End: 2023-04-06 | Stop reason: SURG

## 2023-04-06 RX ORDER — DIPHENHYDRAMINE HYDROCHLORIDE 50 MG/ML
25 INJECTION INTRAMUSCULAR; INTRAVENOUS EVERY 6 HOURS PRN
Status: DISCONTINUED | OUTPATIENT
Start: 2023-04-06 | End: 2023-04-08 | Stop reason: HOSPADM

## 2023-04-06 RX ORDER — DEXAMETHASONE SODIUM PHOSPHATE 4 MG/ML
INJECTION, SOLUTION INTRA-ARTICULAR; INTRALESIONAL; INTRAMUSCULAR; INTRAVENOUS; SOFT TISSUE PRN
Status: DISCONTINUED | OUTPATIENT
Start: 2023-04-06 | End: 2023-04-06 | Stop reason: SURG

## 2023-04-06 RX ORDER — HYDROMORPHONE HYDROCHLORIDE 2 MG/ML
INJECTION, SOLUTION INTRAMUSCULAR; INTRAVENOUS; SUBCUTANEOUS PRN
Status: DISCONTINUED | OUTPATIENT
Start: 2023-04-06 | End: 2023-04-06 | Stop reason: SURG

## 2023-04-06 RX ORDER — DOCUSATE SODIUM 100 MG/1
100 CAPSULE, LIQUID FILLED ORAL 2 TIMES DAILY
Status: DISCONTINUED | OUTPATIENT
Start: 2023-04-06 | End: 2023-04-08 | Stop reason: HOSPADM

## 2023-04-06 RX ORDER — OXYCODONE HCL 5 MG/5 ML
10 SOLUTION, ORAL ORAL
Status: DISCONTINUED | OUTPATIENT
Start: 2023-04-06 | End: 2023-04-06 | Stop reason: HOSPADM

## 2023-04-06 RX ORDER — TRANEXAMIC ACID 100 MG/ML
INJECTION, SOLUTION INTRAVENOUS
Status: COMPLETED | OUTPATIENT
Start: 2023-04-06 | End: 2023-04-06

## 2023-04-06 RX ORDER — TRANEXAMIC ACID 100 MG/ML
INJECTION, SOLUTION INTRAVENOUS PRN
Status: DISCONTINUED | OUTPATIENT
Start: 2023-04-06 | End: 2023-04-06 | Stop reason: SURG

## 2023-04-06 RX ORDER — ENEMA 19; 7 G/133ML; G/133ML
1 ENEMA RECTAL
Status: DISCONTINUED | OUTPATIENT
Start: 2023-04-06 | End: 2023-04-08 | Stop reason: HOSPADM

## 2023-04-06 RX ORDER — BISACODYL 10 MG
10 SUPPOSITORY, RECTAL RECTAL
Status: DISCONTINUED | OUTPATIENT
Start: 2023-04-06 | End: 2023-04-08 | Stop reason: HOSPADM

## 2023-04-06 RX ORDER — SODIUM CHLORIDE, SODIUM LACTATE, POTASSIUM CHLORIDE, CALCIUM CHLORIDE 600; 310; 30; 20 MG/100ML; MG/100ML; MG/100ML; MG/100ML
INJECTION, SOLUTION INTRAVENOUS CONTINUOUS
Status: ACTIVE | OUTPATIENT
Start: 2023-04-06 | End: 2023-04-06

## 2023-04-06 RX ORDER — HYDROMORPHONE HYDROCHLORIDE 1 MG/ML
0.4 INJECTION, SOLUTION INTRAMUSCULAR; INTRAVENOUS; SUBCUTANEOUS
Status: DISCONTINUED | OUTPATIENT
Start: 2023-04-06 | End: 2023-04-06 | Stop reason: HOSPADM

## 2023-04-06 RX ORDER — SCOLOPAMINE TRANSDERMAL SYSTEM 1 MG/1
1 PATCH, EXTENDED RELEASE TRANSDERMAL
Status: DISCONTINUED | OUTPATIENT
Start: 2023-04-06 | End: 2023-04-08 | Stop reason: HOSPADM

## 2023-04-06 RX ORDER — LIDOCAINE HYDROCHLORIDE 20 MG/ML
INJECTION, SOLUTION EPIDURAL; INFILTRATION; INTRACAUDAL; PERINEURAL PRN
Status: DISCONTINUED | OUTPATIENT
Start: 2023-04-06 | End: 2023-04-06 | Stop reason: SURG

## 2023-04-06 RX ORDER — OXYCODONE HYDROCHLORIDE 10 MG/1
10 TABLET ORAL
Status: DISCONTINUED | OUTPATIENT
Start: 2023-04-06 | End: 2023-04-08 | Stop reason: HOSPADM

## 2023-04-06 RX ORDER — SODIUM CHLORIDE, SODIUM LACTATE, POTASSIUM CHLORIDE, CALCIUM CHLORIDE 600; 310; 30; 20 MG/100ML; MG/100ML; MG/100ML; MG/100ML
INJECTION, SOLUTION INTRAVENOUS CONTINUOUS
Status: DISCONTINUED | OUTPATIENT
Start: 2023-04-06 | End: 2023-04-06 | Stop reason: HOSPADM

## 2023-04-06 RX ORDER — EPHEDRINE SULFATE 50 MG/ML
INJECTION, SOLUTION INTRAVENOUS PRN
Status: DISCONTINUED | OUTPATIENT
Start: 2023-04-06 | End: 2023-04-06 | Stop reason: SURG

## 2023-04-06 RX ORDER — POLYETHYLENE GLYCOL 3350 17 G/17G
1 POWDER, FOR SOLUTION ORAL 2 TIMES DAILY PRN
Status: DISCONTINUED | OUTPATIENT
Start: 2023-04-06 | End: 2023-04-08 | Stop reason: HOSPADM

## 2023-04-06 RX ORDER — OXYCODONE HCL 5 MG/5 ML
5 SOLUTION, ORAL ORAL
Status: DISCONTINUED | OUTPATIENT
Start: 2023-04-06 | End: 2023-04-06 | Stop reason: HOSPADM

## 2023-04-06 RX ORDER — DIPHENHYDRAMINE HYDROCHLORIDE 50 MG/ML
12.5 INJECTION INTRAMUSCULAR; INTRAVENOUS
Status: DISCONTINUED | OUTPATIENT
Start: 2023-04-06 | End: 2023-04-06 | Stop reason: HOSPADM

## 2023-04-06 RX ORDER — ACETAMINOPHEN 325 MG/1
650 TABLET ORAL ONCE
Status: COMPLETED | OUTPATIENT
Start: 2023-04-06 | End: 2023-04-06

## 2023-04-06 RX ORDER — CEFAZOLIN SODIUM 1 G/3ML
2 INJECTION, POWDER, FOR SOLUTION INTRAMUSCULAR; INTRAVENOUS ONCE
Status: DISCONTINUED | OUTPATIENT
Start: 2023-04-06 | End: 2023-04-06 | Stop reason: HOSPADM

## 2023-04-06 RX ORDER — HALOPERIDOL 5 MG/ML
1 INJECTION INTRAMUSCULAR EVERY 6 HOURS PRN
Status: DISCONTINUED | OUTPATIENT
Start: 2023-04-06 | End: 2023-04-08 | Stop reason: HOSPADM

## 2023-04-06 RX ORDER — ROPIVACAINE HYDROCHLORIDE 5 MG/ML
INJECTION, SOLUTION EPIDURAL; INFILTRATION; PERINEURAL PRN
Status: DISCONTINUED | OUTPATIENT
Start: 2023-04-06 | End: 2023-04-06 | Stop reason: SURG

## 2023-04-06 RX ORDER — HYDROMORPHONE HYDROCHLORIDE 1 MG/ML
0.5 INJECTION, SOLUTION INTRAMUSCULAR; INTRAVENOUS; SUBCUTANEOUS
Status: DISCONTINUED | OUTPATIENT
Start: 2023-04-06 | End: 2023-04-08 | Stop reason: HOSPADM

## 2023-04-06 RX ORDER — OXYCODONE HYDROCHLORIDE 5 MG/1
5 TABLET ORAL
Status: DISCONTINUED | OUTPATIENT
Start: 2023-04-06 | End: 2023-04-08 | Stop reason: HOSPADM

## 2023-04-06 RX ORDER — VANCOMYCIN HYDROCHLORIDE 1 G/20ML
INJECTION, POWDER, LYOPHILIZED, FOR SOLUTION INTRAVENOUS
Status: COMPLETED | OUTPATIENT
Start: 2023-04-06 | End: 2023-04-06

## 2023-04-06 RX ORDER — PHENYLEPHRINE HCL IN 0.9% NACL 0.5 MG/5ML
SYRINGE (ML) INTRAVENOUS PRN
Status: DISCONTINUED | OUTPATIENT
Start: 2023-04-06 | End: 2023-04-06 | Stop reason: SURG

## 2023-04-06 RX ORDER — ONDANSETRON 2 MG/ML
4 INJECTION INTRAMUSCULAR; INTRAVENOUS
Status: DISCONTINUED | OUTPATIENT
Start: 2023-04-06 | End: 2023-04-06 | Stop reason: HOSPADM

## 2023-04-06 RX ADMIN — TRANEXAMIC ACID 1000 MG: 100 INJECTION, SOLUTION INTRAVENOUS at 07:14

## 2023-04-06 RX ADMIN — FENTANYL CITRATE 100 MCG: 50 INJECTION, SOLUTION INTRAMUSCULAR; INTRAVENOUS at 07:17

## 2023-04-06 RX ADMIN — DEXAMETHASONE SODIUM PHOSPHATE 8 MG: 4 INJECTION, SOLUTION INTRA-ARTICULAR; INTRALESIONAL; INTRAMUSCULAR; INTRAVENOUS; SOFT TISSUE at 07:13

## 2023-04-06 RX ADMIN — HYDROMORPHONE HYDROCHLORIDE 1 MG: 2 INJECTION INTRAMUSCULAR; INTRAVENOUS; SUBCUTANEOUS at 07:37

## 2023-04-06 RX ADMIN — PROPOFOL 150 MG: 10 INJECTION, EMULSION INTRAVENOUS at 07:04

## 2023-04-06 RX ADMIN — SODIUM CHLORIDE, POTASSIUM CHLORIDE, SODIUM LACTATE AND CALCIUM CHLORIDE: 600; 310; 30; 20 INJECTION, SOLUTION INTRAVENOUS at 06:18

## 2023-04-06 RX ADMIN — OXYCODONE 5 MG: 5 TABLET ORAL at 20:22

## 2023-04-06 RX ADMIN — CEFAZOLIN 2 G: 330 INJECTION, POWDER, FOR SOLUTION INTRAMUSCULAR; INTRAVENOUS at 07:06

## 2023-04-06 RX ADMIN — LIDOCAINE HYDROCHLORIDE 100 MG: 20 INJECTION, SOLUTION EPIDURAL; INFILTRATION; INTRACAUDAL at 06:34

## 2023-04-06 RX ADMIN — ASPIRIN 81 MG: 81 TABLET, COATED ORAL at 21:53

## 2023-04-06 RX ADMIN — ACETAMINOPHEN 650 MG: 325 TABLET, FILM COATED ORAL at 06:24

## 2023-04-06 RX ADMIN — ROPIVACAINE HYDROCHLORIDE 20 ML: 5 INJECTION, SOLUTION EPIDURAL; INFILTRATION; PERINEURAL at 06:36

## 2023-04-06 RX ADMIN — OXYCODONE 5 MG: 5 TABLET ORAL at 23:32

## 2023-04-06 RX ADMIN — TRANEXAMIC ACID 1000 MG: 100 INJECTION, SOLUTION INTRAVENOUS at 07:56

## 2023-04-06 RX ADMIN — HYDROMORPHONE HYDROCHLORIDE 1 MG: 2 INJECTION INTRAMUSCULAR; INTRAVENOUS; SUBCUTANEOUS at 07:00

## 2023-04-06 RX ADMIN — EPHEDRINE SULFATE 10 MG: 50 INJECTION, SOLUTION INTRAVENOUS at 07:13

## 2023-04-06 RX ADMIN — Medication 200 MCG: at 07:13

## 2023-04-06 ASSESSMENT — COGNITIVE AND FUNCTIONAL STATUS - GENERAL
STANDING UP FROM CHAIR USING ARMS: A LOT
WALKING IN HOSPITAL ROOM: A LOT
DRESSING REGULAR UPPER BODY CLOTHING: A LITTLE
DAILY ACTIVITIY SCORE: 21
CLIMB 3 TO 5 STEPS WITH RAILING: A LOT
SUGGESTED CMS G CODE MODIFIER MOBILITY: CK
MOBILITY SCORE: 15
DRESSING REGULAR LOWER BODY CLOTHING: A LITTLE
MOVING FROM LYING ON BACK TO SITTING ON SIDE OF FLAT BED: A LITTLE
HELP NEEDED FOR BATHING: A LITTLE
SUGGESTED CMS G CODE MODIFIER DAILY ACTIVITY: CJ
MOVING TO AND FROM BED TO CHAIR: A LITTLE
TURNING FROM BACK TO SIDE WHILE IN FLAT BAD: A LITTLE

## 2023-04-06 ASSESSMENT — PAIN DESCRIPTION - PAIN TYPE
TYPE: SURGICAL PAIN

## 2023-04-06 ASSESSMENT — PATIENT HEALTH QUESTIONNAIRE - PHQ9
1. LITTLE INTEREST OR PLEASURE IN DOING THINGS: NOT AT ALL
SUM OF ALL RESPONSES TO PHQ9 QUESTIONS 1 AND 2: 0
1. LITTLE INTEREST OR PLEASURE IN DOING THINGS: NOT AT ALL
2. FEELING DOWN, DEPRESSED, IRRITABLE, OR HOPELESS: NOT AT ALL
2. FEELING DOWN, DEPRESSED, IRRITABLE, OR HOPELESS: NOT AT ALL
SUM OF ALL RESPONSES TO PHQ9 QUESTIONS 1 AND 2: 0
2. FEELING DOWN, DEPRESSED, IRRITABLE, OR HOPELESS: NOT AT ALL
1. LITTLE INTEREST OR PLEASURE IN DOING THINGS: NOT AT ALL
SUM OF ALL RESPONSES TO PHQ9 QUESTIONS 1 AND 2: 0

## 2023-04-06 ASSESSMENT — LIFESTYLE VARIABLES
ALCOHOL_USE: NO
AVERAGE NUMBER OF DAYS PER WEEK YOU HAVE A DRINK CONTAINING ALCOHOL: 0
TOTAL SCORE: 0
EVER FELT BAD OR GUILTY ABOUT YOUR DRINKING: NO
TOTAL SCORE: 0
CONSUMPTION TOTAL: NEGATIVE
HAVE YOU EVER FELT YOU SHOULD CUT DOWN ON YOUR DRINKING: NO
EVER HAD A DRINK FIRST THING IN THE MORNING TO STEADY YOUR NERVES TO GET RID OF A HANGOVER: NO
HOW MANY TIMES IN THE PAST YEAR HAVE YOU HAD 5 OR MORE DRINKS IN A DAY: 0
ON A TYPICAL DAY WHEN YOU DRINK ALCOHOL HOW MANY DRINKS DO YOU HAVE: 0
TOTAL SCORE: 0
HAVE PEOPLE ANNOYED YOU BY CRITICIZING YOUR DRINKING: NO

## 2023-04-06 ASSESSMENT — FIBROSIS 4 INDEX: FIB4 SCORE: 1.44

## 2023-04-06 NOTE — LETTER
March 22, 2023    Patient Name: Shital Hemphill  Surgeon Name: Srinivasan Salinas M.D.  Surgery Facility: [facility] ([address] [facility])  Surgery Date: 4/6/2023    The time of your surgery is not final and may change up to and until the day of your surgery. You will be contacted 24-48 hours prior to your surgery date with your check-in and surgery time.    If you will not be at one of the below numbers please call the surgery scheduler at [phone]  Preferred Phone: [phone]    BEFORE YOUR SURGERY   Pre Registration and/or Lab Work must be done within and no earlier than 28 days prior to your surgery date. Please call [facility] at [phone] for an appointment as soon as possible.    Pre op Appointment:   Date: 03/29/2023   Time: 10:00AM   Provider: Srinivasan Salinas M.D.   Location: [address] [place] [id number]  Instructions: Bring a list of all medications you are taking including the dosing and frequency.    DAY OF YOUR SURGERY  Nothing to eat or drink after midnight     Refrain from smoking any substance after midnight prior to surgery. Smoking may interfere with the anesthetic and frequently produces nausea during the recovery period.    Continue taking all lifesaving medications. Including the morning of your surgery with small sip of water.    Please do NOT take on the day of surgery:  Diuretics: examples- furosemide (Lasix), spironolactone, hydrochlorothiazide  ACE-inhibitors: examples- lisinopril, ramipril, enalapril  “ARBs”: examples- losartan, Olmesartan, valsartan    Please arrive at the hospital/surgery center at the check-in time provided.     An adult will need to bring you and take you home after your surgery.     AFTER YOUR SURGERY  Post op Appointment:   Date: 04/21/2023   Time: 09:30AM   With: Srinivasan Salinas M.D.   Location: [address] [place] [id number]    - Therapy- Your first appointment should be 3  day(s) after your  surgery. For your convenience we have 4 Physical Therapy locations: [facility], [place], and [facility]. Call our office ASAP to schedule an appointment at [phone] or take the enclosed Therapy Prescription to a facility of your choice.  - No dental work for 3-6 months after your surgery.  - Post Surgery - You will need someone to drive you home  - Post Surgery - You will need someone to stay with you for 24 hours  - You must have someone provide transportation post surgery and someone to monitor you for at least 24 hours post-surgery. If you don't have either of these your appointment will be canceled.     TIME OFF WORK  FMLA or Disability forms can be faxed directly to: [phone] or you may drop them off at [address] [place] [address]. Our office charges a $35.00 fee per form. Forms will be completed within 10 business days of drop off and payment received. For the status of your forms you may contact our disability office directly [phone].    MEDICATION INSTRUCTIONS **Please read section completely**    The following medications should be stopped a minimum of 10 days prior to surgery:  All over the counter, Supplements & Herbal medications    Anorectics: Phentermine (Adipex-P, Lomaira and Suprenza), Phentermine-topiramate (Qsymia), Bupropion-naltrexone (Contrave)    Opiod Partial Agonists/Opioid Antagonists: Buprenorphine (Subocone, Belbuca, Butrans, Probuphine Implant, Sublocade), Naltrexone (ReVia, Vivitrol), Naloxone    Amphetamines: Dextroamphetamine/Amphetamine (Adderall, Mydayis), Methylphenidate Hydrochloride (Concerta, Metadate, Methylin, Ritalin)    The following medications should be stopped 5 days prior to surgery:  Blood Thinners: Any Aspirin, Aspirin products, anti-inflammatories such as ibuprofen and any blood thinners such as Coumadin and Plavix. Please consult your prescribing physician if you are on life saving blood thinners, in regards to when to stop  medications prior to surgery.     The following medications should be stopped a minimum of 3 days prior to surgery:  PDE-5 inhibitors: Sildenafil (Viagra), Tadalafil (Cialis), Vardenafil (Levitra), Avanafil (Stendra)    MAO Inhibitors: Rasagiline (Azilect), Selegiline (Eldepryl, Emsam, Selapar), Isocarboxazid (Marplan), Phenelzine (Nardil)

## 2023-04-06 NOTE — ANESTHESIA POSTPROCEDURE EVALUATION
Patient: Shital Saini Acla    Procedure Summary     Date: 04/06/23 Room / Location:  OR  / SURGERY [facility]    Anesthesia Start: 0700 Anesthesia Stop: 0827    Procedure: ARTHROPLASTY, KNEE, TOTAL (Right: Knee) Diagnosis:       Right knee pain      Tricompartment osteoarthritis of right knee      (Tricompartment osteoarthritis of right knee )    Surgeons: Srinivasan Salinas M.D. Responsible Provider: Francis Hyde M.D.    Anesthesia Type: general, peripheral nerve block ASA Status: 2          Final Anesthesia Type: general, peripheral nerve block  Last vitals  BP   Blood Pressure : (!) 164/83    Temp   36.6 °C (97.9 °F)    Pulse   95   Resp   18    SpO2   95 %      Anesthesia Post Evaluation    Patient location during evaluation: PACU  Patient participation: waiting for patient participation    Airway patency: patent  Anesthetic complications: no  Cardiovascular status: hemodynamically stable  Respiratory status: acceptable  Hydration status: euvolemic    PONV: none          There were no known notable events for this encounter.     Nurse Pain Score: 0 (NPRS)

## 2023-04-06 NOTE — PROGRESS NOTES
Received pt's report from PACU RN. Pt transported to room 105 via bed. Pt is sleepy, wakes easily to voice. Not in any distress, on 3L via NC. Pt denies any pain or N/V at this time. L knee incision, observed, CDI. Able to dorsi and plantar flex LLE. Discussed POC. Fall risk precaution in place, locked bed in lowest position, personal items and call light within reach. All needs met at this time. Hourly rounding in place.

## 2023-04-06 NOTE — ANESTHESIA PROCEDURE NOTES
Airway    Date/Time: 4/6/2023 7:09 AM  Performed by: Francis Hyde M.D.  Authorized by: Francis Hyde M.D.     Location:  OR  Urgency:  Elective  Indications for Airway Management:  Anesthesia      Spontaneous Ventilation: absent    Sedation Level:  Deep  Preoxygenated: Yes    Mask Difficulty Assessment:  1 - vent by mask  Final Airway Type:  Supraglottic airway  Final Supraglottic Airway:  Standard LMA    SGA Size:  4  Number of Attempts at Approach:  1

## 2023-04-06 NOTE — OR NURSING
0935: Report from CAROLIN Goldberg. Pt sleeping comfortably. Right knee dressing CDI. Pedal pulses +2. 2L nasal canula O2 sat 92%.  X-ray at bedside.     0945: Pt. denies pain or nausea. A&Ox4.  Is very sleepy. Unable to wean off of oxygen.  80% on RA. Pt placed back on 3L NC. Offered water however pt is refusing.     Family updated that pt will be needing to be admitted [facility],orders released.     1005: Report CAROLIN [place].  Family updated. Waiting for transport.   Pt sipping on water w/o difficulty.     1030: Pt sleeping. No change in surgical site assessment. Awaiting transport.    1113: Transport taking pt to [facility].

## 2023-04-06 NOTE — ANESTHESIA TIME REPORT
Anesthesia Start and Stop Event Times     Date Time Event    4/6/2023 0648 Ready for Procedure     0700 Anesthesia Start     0827 Anesthesia Stop        Responsible Staff  04/06/23    Name Role Begin End    Francis Hyde M.D. Anesth 0700 0827        Overtime Reason:  no overtime (within assigned shift)    Comments:

## 2023-04-06 NOTE — ANESTHESIA PREPROCEDURE EVALUATION
Case: [id number] Date/Time: 04/06/23 0645    Procedure: ARTHROPLASTY, KNEE, TOTAL (Right)    Diagnosis:       Right knee pain [M25.561]      Tricompartment osteoarthritis of right knee [M17.11]    Pre-op diagnosis: Tricompartment osteoarthritis of right knee [M17.11]    Location:  OR 02 / SURGERY [facility]    Surgeons: Srinivasan Salinas M.D.          Relevant Problems   CARDIAC   (positive) HTN (hypertension)       Physical Exam    Airway   Mallampati: II  TM distance: >3 FB  Neck ROM: full       Cardiovascular - normal exam  Rhythm: regular  Rate: normal  (-) murmur     Dental - normal exam  (+) upper dentures, lower dentures           Pulmonary - normal exam  Breath sounds clear to auscultation     Abdominal    Neurological - normal exam                 Anesthesia Plan    ASA 2       Plan - general and peripheral nerve block     Peripheral nerve block will be post-op pain control  Airway plan will be LMA    (Discussed anesthesia and plan with family of pt over video conference, and adult daughter who is with pt)      Induction: intravenous    Postoperative Plan: Postoperative administration of opioids is intended.    Pertinent diagnostic labs and testing reviewed    Informed Consent:    Anesthetic plan and risks discussed with patient.    Use of blood products discussed with: patient whom consented to blood products.

## 2023-04-06 NOTE — ANESTHESIA PROCEDURE NOTES
Peripheral Block    Date/Time: 4/6/2023 6:34 AM  Performed by: Francis Hyde M.D.  Authorized by: Francis Hyde M.D.     Start Time:  4/6/2023 6:34 AM  End Time:  4/6/2023 6:39 AM  Reason for Block: at surgeon's request and post-op pain management ONLY    patient identified, IV checked, site marked, risks and benefits discussed, surgical consent, monitors and equipment checked, pre-op evaluation and timeout performed    Patient Position:  Supine  Prep: ChloraPrep    Block Region:  Lower Extremity  Lower Extremity - Block Type:  Selective FEMORAL nerve block at the Adductor Canal    Laterality:  Right  Procedures: ultrasound guided  Image captured, interpreted and electronically stored.  Local Infiltration:  Lidocaine  Block Type:  Single-shot  Needle Length:  100mm  Needle Gauge:  21 G  Needle Localization:  Ultrasound guidance  Injection Assessment:  Negative aspiration for heme, no paresthesia on injection, incremental injection and local visualized surrounding nerve on ultrasound  Evidence of intravascular injection: No     Single pass, easy placement, no paresthesias

## 2023-04-06 NOTE — H&P
Surgery Orthopedic History & Physical Note    Date  4/6/2023    Primary Care Physician  Eyal Pastor M.D.    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 female who presented with right knee pain secondary to advanced djd.  She elects to undergo a right TKA.    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 10 mL/hr at 04/06/23 0618 New Bag at 04/06/23 0618    ceFAZolin (ANCEF) injection 2 g  2 g Intravenous Once Srinivasan Salinas M.D.           Social History     Socioeconomic History    Marital status:      Spouse name: Not on file    Number of children: Not on file    Years of education: Not on file    Highest education level: Not on file   Occupational History    Not on file   Tobacco Use    Smoking status: Never    Smokeless tobacco: Never   Vaping Use    Vaping Use: Never used   Substance and Sexual Activity    Alcohol use: Not Currently    Drug use: Not Currently    Sexual activity: Not Currently   Other Topics Concern    Not on file   Social History Narrative    Not on file     Social Determinants of Health     Financial Resource Strain: Not on file   Food Insecurity: Not on file   Transportation Needs: Not on file   Physical Activity: Not on file   Stress: Not on file   Social Connections: Not on file   Intimate Partner Violence: Not on file   Housing Stability: Not on file       Family History   Problem Relation Age of Onset     Hypertension Mother        Allergies  Patient has no known allergies.    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

## 2023-04-06 NOTE — OR NURSING
0825 To PACU from OR by a bed, pt sleeping, respirations spontaneous and nonlabored via LMA     0840 Ice pack applied over clean, dry, intact right knee surgical dressing.    0855 pt sleeping, VSS, no s/s of pain or discomfort.     0910 no change in pt's status.     0925 pt arouses spontaneously, denies any pain or discomfort.     0933 pt care handed over to CAROLIN Bearden.

## 2023-04-07 PROCEDURE — 97162 PT EVAL MOD COMPLEX 30 MIN: CPT

## 2023-04-07 PROCEDURE — A9270 NON-COVERED ITEM OR SERVICE: HCPCS | Performed by: ORTHOPAEDIC SURGERY

## 2023-04-07 PROCEDURE — 700102 HCHG RX REV CODE 250 W/ 637 OVERRIDE(OP): Performed by: ORTHOPAEDIC SURGERY

## 2023-04-07 PROCEDURE — G0378 HOSPITAL OBSERVATION PER HR: HCPCS

## 2023-04-07 PROCEDURE — 94760 N-INVAS EAR/PLS OXIMETRY 1: CPT

## 2023-04-07 RX ADMIN — ASPIRIN 81 MG: 81 TABLET, COATED ORAL at 21:51

## 2023-04-07 RX ADMIN — OXYCODONE 5 MG: 5 TABLET ORAL at 10:55

## 2023-04-07 RX ADMIN — OXYCODONE HYDROCHLORIDE 10 MG: 10 TABLET ORAL at 03:39

## 2023-04-07 RX ADMIN — ASPIRIN 81 MG: 81 TABLET, COATED ORAL at 10:55

## 2023-04-07 ASSESSMENT — COGNITIVE AND FUNCTIONAL STATUS - GENERAL
STANDING UP FROM CHAIR USING ARMS: A LITTLE
TURNING FROM BACK TO SIDE WHILE IN FLAT BAD: A LITTLE
MOVING FROM LYING ON BACK TO SITTING ON SIDE OF FLAT BED: A LITTLE
SUGGESTED CMS G CODE MODIFIER MOBILITY: CK
WALKING IN HOSPITAL ROOM: A LITTLE
MOBILITY SCORE: 17
MOVING TO AND FROM BED TO CHAIR: A LITTLE
CLIMB 3 TO 5 STEPS WITH RAILING: A LOT

## 2023-04-07 ASSESSMENT — PAIN DESCRIPTION - PAIN TYPE
TYPE: SURGICAL PAIN

## 2023-04-07 ASSESSMENT — PATIENT HEALTH QUESTIONNAIRE - PHQ9
SUM OF ALL RESPONSES TO PHQ9 QUESTIONS 1 AND 2: 0
1. LITTLE INTEREST OR PLEASURE IN DOING THINGS: NOT AT ALL
1. LITTLE INTEREST OR PLEASURE IN DOING THINGS: NOT AT ALL
2. FEELING DOWN, DEPRESSED, IRRITABLE, OR HOPELESS: NOT AT ALL
SUM OF ALL RESPONSES TO PHQ9 QUESTIONS 1 AND 2: 0
2. FEELING DOWN, DEPRESSED, IRRITABLE, OR HOPELESS: NOT AT ALL

## 2023-04-07 ASSESSMENT — GAIT ASSESSMENTS
DISTANCE (FEET): 100
ASSISTIVE DEVICE: FRONT WHEEL WALKER
GAIT LEVEL OF ASSIST: SUPERVISED
DEVIATION: ANTALGIC

## 2023-04-07 NOTE — PROGRESS NOTES
4 Eyes Skin Assessment Completed by CAROLIN Toledo and CAROLIN Degroot.    Head WDL  Ears WDL  Nose WDL  Mouth WDL  Neck WDL  Breast/Chest WDL  Shoulder Blades WDL  Spine WDL  (R) Arm/Elbow/Hand WDL  (L) Arm/Elbow/Hand WDL  Abdomen WDL  Groin WDL  Scrotum/Coccyx/Buttocks WDL  (R) Leg WDL  (L) Leg R knee incision, dressing in place  (R) Heel/Foot/Toe WDL  (L) Heel/Foot/Toe WDL          Devices In Places Blood Pressure Cuff, Pulse Ox, SCD's, and Nasal Cannula      Interventions In Place NC W/Ear Foams and Pillows    Possible Skin Injury No    Pictures Uploaded Into Epic N/A  Wound Consult Placed N/A  RN Wound Prevention Protocol Ordered No

## 2023-04-07 NOTE — PROGRESS NOTES
Received report from day shift nurse. Assumed pt care at 1915. Pt is A&Ox4, resting comfortably in bed. Pt on r.a.. No signs of SOB/respiratory distress. Labs noted, VSS. Post op dressing dry and intact , refused SCD on the affected leg .Fall precautions in place. Bed at lowest position. Call light and personal belongings within reach. Continue to monitor

## 2023-04-07 NOTE — THERAPY
Occupational Therapy Contact Note    Patient Name: Shital Hemphill  Age:  77 y.o., Sex:  female  Medical Record #: [id number]  Today's Date: 4/7/2023    Discussed missed therapy with Nursing       04/07/23 0801   Initial Contact Note    Initial Contact Note Order Received and Verified, Occupational Therapy Evaluation in Progress with Full Report to Follow.   Interdisciplinary Plan of Care Collaboration   IDT Collaboration with  Nursing   Patient Position at End of Therapy In Bed;Call Light within Reach;Tray Table within Reach;Phone within Reach   Collaboration Comments Attempted OT eval - pt requested return visit at a later time - pt to stay the night.  Therapist will re-attempt OT eval tomorrow morning.

## 2023-04-07 NOTE — PROGRESS NOTES
BSSR received from night RN. Pt is resting comfortably in bed, not in any distress, on RA. AAOx4. Denies any pain at this time. L knee incision, CDI. Discussed POC. Fall risk precaution in place, locked bed in lowest position, personal items and call light within reach. All needs met at this time. Hourly rounding in place.

## 2023-04-07 NOTE — CARE PLAN
The patient is Stable - Low risk of patient condition declining or worsening    Shift Goals  Clinical Goals: Pt able to manage pain, ambulate 50 ft, void and >90% O2 sat during this shift  Patient Goals: Pt will tolerate pain,rest comfortably  Family Goals: n/a    Progress made toward(s) clinical / shift goals:  Pt able to manage pain, declines pain meds. Able to ambulate more than 50 ft with a FWW and voided today. Wean off O2 at 95%. Fall risk  precaution in place, pt did not sustain any fall/injury during this shift.    Patient is not progressing towards the following goals:

## 2023-04-07 NOTE — CARE PLAN
The patient is Stable - Low risk of patient condition declining or worsening    Shift Goals  Clinical Goals: Patient will be seen by PT/OT and tolerate activities  Patient Goals: See PT/OT and be in minimal pain  Family Goals: n/a    Progress made toward(s) clinical / shift goals:  Patient has been seen my PT/OT and tolerated activities with tolerable pain.     Problem: Fall Risk  Goal: Patient will remain free from falls  Outcome: Progressing     Problem: Knowledge Deficit - Standard  Goal: Patient and family/care givers will demonstrate understanding of plan of care, disease process/condition, diagnostic tests and medications  Outcome: Progressing     Problem: Pain - Post Surgery  Goal: Alleviation or reduction of pain post surgery  Outcome: Progressing  Goal: Proper management of On-Q Pump  Outcome: Progressing     Problem: Pain - Standard  Goal: Alleviation of pain or a reduction in pain to the patient’s comfort goal  Outcome: Progressing     Problem: Incision Care  Goal: Optimal post surgical incision care  Outcome: Progressing       Patient is not progressing towards the following goals:

## 2023-04-07 NOTE — THERAPY
Physical Therapy   Initial Evaluation     Patient Name: Shital Saini Acla  Age:  77 y.o., Sex:  female  Medical Record #: [id number]  Today's Date: 4/7/2023     Precautions  Precautions: (P) Weight Bearing As Tolerated Right Lower Extremity    Assessment  Patient is 77 y.o. female with a diagnosis of R TKR Pt lives at home with daughter.she is unable to help secondary to radiation treatment.Acute PT needed to improve bed mob,transfers,ambulation and ROM.      Plan    Physical Therapy Initial Treatment Plan   Treatment Plan : (P) Bed Mobility, Gait Training, Neuro Re-Education / Balance, Stair Training, Therapeutic Activities, Therapeutic Exercise  Treatment Frequency: (P) Daily    DC Equipment Recommendations: (P) None  Discharge Recommendations: (P) Recommend outpatient physical therapy services to address higher level deficits       Objective       04/07/23 1100   Charge Group   PT Evaluation PT Evaluation Mod   Total Time Spent   PT Evaluation Time Spent (Mins) 40   Initial Contact Note    Initial Contact Note Order Received and Verified, Physical Therapy Evaluation in Progress with Full Report to Follow.   Precautions   Precautions Weight Bearing As Tolerated Right Lower Extremity   Prior Living Situation   Prior Services None   Housing / Facility 1 Story House   Equipment Owned Front-Wheel Walker   Lives with - Patient's Self Care Capacity Adult Children   Prior Level of Functional Mobility   Bed Mobility Independent   Transfer Status Independent   Ambulation Independent   Ambulation Distance limited community   Assistive Devices Used None   Cognition    Cognition / Consciousness WDL   Passive ROM Lower Body   Passive ROM Lower Body X   Rt Knee Flexion Degrees 90   Rt Knee Extension Degrees 0   Active ROM Lower Body    Active ROM Lower Body  X   Strength Lower Body   Lower Body Strength  X   Sensation Lower Body   Lower Extremity Sensation   WDL   Coordination Lower Body    Coordination Lower Body  WDL    Balance Assessment   Sitting Balance (Static) Fair +   Sitting Balance (Dynamic) Fair +   Standing Balance (Static) Fair   Standing Balance (Dynamic) Fair   Weight Shift Sitting Fair   Weight Shift Standing Fair   Bed Mobility    Supine to Sit Moderate Assist   Gait Analysis   Gait Level Of Assist Supervised   Assistive Device Front Wheel Walker   Distance (Feet) 100   # of Times Distance was Traveled 1   Deviation Antalgic   # of Stairs Climbed 0   Weight Bearing Status WBAT R   Functional Mobility   Sit to Stand Minimal Assist   Bed, Chair, Wheelchair Transfer Contact Guard Assist   Transfer Method Stand Step   How much difficulty does the patient currently have...   Turning over in bed (including adjusting bedclothes, sheets and blankets)? 3   Sitting down on and standing up from a chair with arms (e.g., wheelchair, bedside commode, etc.) 3   Moving from lying on back to sitting on the side of the bed? 3   How much help from another person does the patient currently need...   Moving to and from a bed to a chair (including a wheelchair)? 3   Need to walk in a hospital room? 3   Climbing 3-5 steps with a railing? 2   6 clicks Mobility Score 17   Activity Tolerance   Sitting in Chair > 1hr   Sitting Edge of Bed 10   Standing 10   Patient / Family Goals    Patient / Family Goal #1 Home   Short Term Goals    Short Term Goal # 1 S with bed mob in 4 V   Short Term Goal # 2 S transfers in 4 V   Short Term Goal # 3 S amb x 150 feet in 4 V   Physical Therapy Initial Treatment Plan    Treatment Plan  Bed Mobility;Gait Training;Neuro Re-Education / Balance;Stair Training;Therapeutic Activities;Therapeutic Exercise   Treatment Frequency Daily   Problem List    Problems Pain;Impaired Bed Mobility;Impaired Transfers;Impaired Ambulation;Functional Strength Deficit   Anticipated Discharge Equipment and Recommendations   DC Equipment Recommendations None   Discharge Recommendations Recommend outpatient physical therapy services  to address higher level deficits   Interdisciplinary Plan of Care Collaboration   IDT Collaboration with  Nursing   Session Information   Date / Session Number  4/7-1 1/7 4/13   Priority 4

## 2023-04-07 NOTE — CARE PLAN
The patient is Stable - Low risk of patient condition declining or worsening    Shift Goals  Clinical Goals: monitor pain level  Patient Goals: sleep at least 8 hours, able to take her home meds tomorrow  Family Goals: n/a    Progress made toward(s) clinical / shift goals:  Pt received Oxycodone 5 mg po as prn dose during this shift. Pt ambulated up to the bathroom during this shift, tolerated well. Call light within reach. Fall precautions in placed. Hourly rounding in progress.    Patient is not progressing towards the following goals:N/A

## 2023-04-07 NOTE — DISCHARGE PLANNING
Case Management Discharge Planning    Admission Date: 4/6/2023  GMLOS:    ALOS: 0    6-Clicks ADL Score: 21  6-Clicks Mobility Score: 17  PT and/or OT Eval ordered: Yes  Post-acute Referrals Ordered: No  Post-acute Choice Obtained: No  Has referral(s) been sent to post-acute provider:  No      Anticipated Discharge Dispo: Discharge Disposition: Discharged to home/self care (01)    DME Needed: No    Action(s) Taken: Updated Provider/Nurse on Discharge Plan    No anticipated DC needs. RN CM will continue to follow.     Escalations Completed: None    Medically Clear: No    Next Steps: Medical clearance    Barriers to Discharge: Medical clearance

## 2023-04-08 VITALS
RESPIRATION RATE: 16 BRPM | HEIGHT: 64 IN | OXYGEN SATURATION: 93 % | BODY MASS INDEX: 36.06 KG/M2 | DIASTOLIC BLOOD PRESSURE: 79 MMHG | WEIGHT: 211.2 LBS | HEART RATE: 105 BPM | TEMPERATURE: 98.3 F | SYSTOLIC BLOOD PRESSURE: 130 MMHG

## 2023-04-08 PROCEDURE — G0378 HOSPITAL OBSERVATION PER HR: HCPCS

## 2023-04-08 PROCEDURE — 97530 THERAPEUTIC ACTIVITIES: CPT

## 2023-04-08 PROCEDURE — 97165 OT EVAL LOW COMPLEX 30 MIN: CPT

## 2023-04-08 PROCEDURE — A9270 NON-COVERED ITEM OR SERVICE: HCPCS | Performed by: ORTHOPAEDIC SURGERY

## 2023-04-08 PROCEDURE — 94760 N-INVAS EAR/PLS OXIMETRY 1: CPT

## 2023-04-08 PROCEDURE — 700102 HCHG RX REV CODE 250 W/ 637 OVERRIDE(OP): Performed by: ORTHOPAEDIC SURGERY

## 2023-04-08 PROCEDURE — 97535 SELF CARE MNGMENT TRAINING: CPT

## 2023-04-08 PROCEDURE — 97110 THERAPEUTIC EXERCISES: CPT

## 2023-04-08 RX ADMIN — ASPIRIN 81 MG: 81 TABLET, COATED ORAL at 10:25

## 2023-04-08 ASSESSMENT — COGNITIVE AND FUNCTIONAL STATUS - GENERAL
SUGGESTED CMS G CODE MODIFIER DAILY ACTIVITY: CJ
HELP NEEDED FOR BATHING: A LITTLE
DAILY ACTIVITIY SCORE: 22
DRESSING REGULAR LOWER BODY CLOTHING: A LITTLE

## 2023-04-08 ASSESSMENT — GAIT ASSESSMENTS: DISTANCE (FEET): 100

## 2023-04-08 ASSESSMENT — PATIENT HEALTH QUESTIONNAIRE - PHQ9
1. LITTLE INTEREST OR PLEASURE IN DOING THINGS: NOT AT ALL
SUM OF ALL RESPONSES TO PHQ9 QUESTIONS 1 AND 2: 0
2. FEELING DOWN, DEPRESSED, IRRITABLE, OR HOPELESS: NOT AT ALL

## 2023-04-08 ASSESSMENT — ACTIVITIES OF DAILY LIVING (ADL): TOILETING: INDEPENDENT

## 2023-04-08 ASSESSMENT — PAIN DESCRIPTION - PAIN TYPE: TYPE: SURGICAL PAIN

## 2023-04-08 NOTE — PROGRESS NOTES
Discharge per MD order. Discussed discharge instructions, follow up visits and medication list with pt and daughter. Verbalized understanding. Belongings accounted for. IV removed. Transported via wheelchair with an escort.

## 2023-04-08 NOTE — CARE PLAN
The patient is Stable - Low risk of patient condition declining or worsening    Shift Goals  Clinical Goals: Pt able to manage pain 3/10 or less, free from fall/injury during this shift  Patient Goals: Pt able to tolerate pain, home today  Family Goals: update POC, home today    Progress made toward(s) clinical / shift goals:  Pt able to manage pain. VS stable. Discharge today. PT/OT done today. Fall risk precaution in place, pt did not sustain any fall/injury during this shift.     Patient is not progressing towards the following goals:

## 2023-04-08 NOTE — THERAPY
Physical Therapy   Daily Treatment     Patient Name: Shital Saini Acla  Age:  77 y.o., Sex:  female  Medical Record #: [id number]  Today's Date: 4/8/2023     Precautions  Precautions: Weight Bearing As Tolerated Right Lower Extremity    Assessment    Pt is safe with bed mob,transfers and ambulation.She understands HEP .Stressed the importance of exercising and increasing ROM   04/08/23 1000   Charge Group   PT Therapeutic Activities (Units) 1   PT Therapeutic Exercise (Units) 1   Passive ROM Lower Body   Rt Knee Flexion Degrees 80   Rt Knee Extension Degrees 0   Supine Lower Body Exercise   Supine Lower Body Exercises Yes   Sitting Lower Body Exercises   Sitting Lower Body Exercises Yes   Home Exercise Program   Home Exercise Program Patient Able to Complete Home Program Independently, Safely   Balance   Sitting Balance (Static) Good   Sitting Balance (Dynamic) Good   Standing Balance (Static) Fair +   Standing Balance (Dynamic) Fair +   Weight Shift Sitting Good   Weight Shift Standing Good   Bed Mobility    Supine to Sit Minimal Assist   Activity Tolerance   Sitting Edge of Bed 10   Standing 5   Short Term Goals    Short Term Goal # 1 S with bed mob in 4 V   Goal Outcome # 1 Progressing as expected   Short Term Goal # 2 S transfers in 4 V   Goal Outcome # 2 Progressing as expected   Short Term Goal # 3 S amb x 150 feet in 4 V   Goal Outcome # 3 Progressing as expected   Interdisciplinary Plan of Care Collaboration   IDT Collaboration with  Nursing   Session Information   Date / Session Number  4/8-2 2/7 4/13         Plan    Treatment Plan Status:    Type of Treatment: Bed Mobility, Gait Training, Neuro Re-Education / Balance, Stair Training, Therapeutic Activities, Therapeutic Exercise  Treatment Frequency: Daily  Treatment Duration:      DC Equipment Recommendations: None  Discharge Recommendations: Recommend outpatient physical therapy services to address higher level deficit     Objective

## 2023-04-08 NOTE — PROGRESS NOTES
BSSR received from night RN. Pt is resting comfortably in bed, not in any distress, on RA. AAOx4. Pt denies any pain or N/V at this time. Discussed POC including pain mgt, OT and anticipated dc today. Fall risk precaution in place, locked bed in lowest position, personal items and call light within reach. All needs met at this time. Hourly rounding in place.

## 2023-04-08 NOTE — THERAPY
Occupational Therapy   Initial Evaluation     Patient Name: Shital Saini Acla  Age:  77 y.o., Sex:  female  Medical Record #: [id number]  Today's Date: 4/8/2023     Precautions  Precautions: (P) Weight Bearing As Tolerated Left Lower Extremity    Assessment  Patient is 77 y.o. female with a diagnosis of Left TKA.  Additional factors influencing patient status / progress: Left knee discomfort, WBAT LLE.  Pt resides [facility] in [place], [place] w/ her  and adult daughter - Pt is the primary caretaker of her  and daughter.  Another adult daughter resides in the area and is available to assist as needed.  PLOF Indep for ADL's, transfers and functional mobility via SPC.  Pt demonstrated  Sup/Min assist bed mobility, SBA sit/stand, Sup FWW, SBA transfers, Mod I toileting, Min assist LB clothing management, Mod I UB clothing management, mod I standing for G&H.  Therapist reviewed/educated pt on environmental/home safety, fall precautions, joint protection, AE/DME, ADL's and transfers.    Plan    DC Equipment Recommendations: (P) None  Discharge Recommendations: (P) Anticipate that the patient will have no further occupational therapy needs after discharge from the hospital     Subjective    Pt was alert and cooperative w/ tx.     Objective       04/08/23 0836    Services   Is patient using  services for this encounter? No   Initial Contact Note    Initial Contact Note Order Received and Verified, Evaluation Only - Patient Does Not Require Further Acute Occupational Therapy at this Time.  However, May Benefit from Post Acute Therapy for Higher Level Functional Deficits.   Prior Living Situation   Prior Services Home-Independent   Housing / Facility 1 Story House   Steps Into Home 0   Steps In Home 0   Bathroom Set up Bathtub / Shower Combination;Shower Curtain;Shower Chair   Equipment Owned Front-Wheel Walker;Single Point Cane;Tub / Shower Seat   Lives with - Patient's Self Care Capacity  Spouse;Adult Children   Comments Pt resides [facility] in [place], [place] w/ her  and adult daughter - Pt is the primary caretaker of her  and daughter.  Another adult daughter resides in the area and is available to assist as needed.  PLOF Indep for ADL's, transfers and functional mobility via SPC.   Prior Level of ADL Function   Self Feeding Independent   Grooming / Hygiene Independent   Bathing Independent   Dressing Independent   Toileting Independent   Prior Level of IADL Function   Medication Management Independent   Laundry Independent   Kitchen Mobility Independent   Finances Independent   Home Management Independent   Shopping Independent   Prior Level Of Mobility Independent With Device in Community;Independent With Steps in Community;Independent With Device in Home;Independent With Steps in Home   Precautions   Precautions Weight Bearing As Tolerated Left Lower Extremity   Vitals   Pulse Oximetry 93 %   O2 Delivery Device None - Room Air   Pain   Intervention Cold Pack;Rest;Repositioned   Pain 0 - 10 Group   Location Knee   Location Orientation Right   Description Aching   Comfort Goal Comfort with Movement;Perform Activity   Therapist Pain Assessment Post Activity Pain Same as Prior to Activity   Non Verbal Descriptors   Non Verbal Scale  Calm;Unlabored Breathing   Cognition    Cognition / Consciousness WDL   Level of Consciousness Alert   Active ROM Upper Body   Active ROM Upper Body  WDL   Dominant Hand Right   Strength Upper Body   Upper Body Strength  WDL   Upper Body Muscle Tone   Upper Body Muscle Tone  WDL   Coordination Upper Body   Coordination WDL   Balance Assessment   Sitting Balance (Static) Good   Sitting Balance (Dynamic) Fair +   Standing Balance (Static) Fair +   Standing Balance (Dynamic) Fair +   Weight Shift Sitting Good   Weight Shift Standing Fair   Comments OOB FWW   Bed Mobility    Supine to Sit Supervised   Sit to Supine Minimal Assist   ADL Assessment   Grooming Modified  Independent;Standing   Upper Body Dressing Independent   Lower Body Dressing Minimal Assist   Toileting Modified Independent   How much help from another person does the patient currently need...   Putting on and taking off regular lower body clothing? 3   Bathing (including washing, rinsing, and drying)? 3   Toileting, which includes using a toilet, bedpan, or urinal? 4   Putting on and taking off regular upper body clothing? 4   Taking care of personal grooming such as brushing teeth? 4   Eating meals? 4   6 Clicks Daily Activity Score 22   Functional Mobility   Sit to Stand Standby Assist   Bed, Chair, Wheelchair Transfer Standby Assist   Toilet Transfers Standby Assist   Transfer Method Stand Step   Mobility Sup FWW   Distance (Feet) 100   # of Times Distance was Traveled 1   Edema / Skin Assessment   Edema / Skin  Not Assessed   Activity Tolerance   Sitting in Chair 10   Sitting Edge of Bed 2   Standing 5x2   Comments sitting/commode 5   Education Group   Education Provided Joint Protection;Home Safety;Transfers;Role of Occupational Therapist;Activities of Daily Living;Adaptive Equipment   Role of Occupational Therapist Patient Response Patient;Acceptance;Explanation;Verbal Demonstration   Joint Protection Patient Response Patient;Acceptance;Explanation;Demonstration;Verbal Demonstration;Action Demonstration   Home Safety Patient Response Patient;Acceptance;Explanation;Demonstration;Verbal Demonstration   Transfers Patient Response Patient;Acceptance;Explanation;Demonstration;Verbal Demonstration;Action Demonstration   ADL Patient Response Patient;Acceptance;Explanation;Demonstration;Verbal Demonstration;Action Demonstration   Adaptive Equipment Patient Response Patient;Acceptance;Explanation;Demonstration;Verbal Demonstration;Action Demonstration   Anticipated Discharge Equipment and Recommendations   DC Equipment Recommendations None   Discharge Recommendations Anticipate that the patient will have no further  occupational therapy needs after discharge from the hospital

## 2023-04-08 NOTE — CARE PLAN
The patient is Stable - Low risk of patient condition declining or worsening    Shift Goals  Clinical Goals: monitor pain level  Patient Goals: sleep at least 8 hours  Family Goals: n/a    Progress made toward(s) clinical / shift goals:  Pt tolerated her pain well during this shift and didn't as for pain med. Pt ambulated up to the bathroom, stand by assist. Call light within reach. Fall precautions in placed. Pt was seen sleeping in between round. Hourly rounding in progress    Patient is not progressing towards the following goals:N/A

## 2023-04-08 NOTE — PROGRESS NOTES
Received report from day shift nurse. Assumed pt care at 1915. Pt is A&Ox4, resting comfortably in bed. Pt on r.a.. No signs of SOB/respiratory distress. Labs noted, VSS. Pt c/o no pain at this moment. Post op dressing dry and intact Fall precautions in place. Bed at lowest position. Call light and personal belongings within reach. Continue to monitor

## 2023-04-08 NOTE — PROGRESS NOTES
"Patient seen and examined  No complaints ready to DC home.    /79   Pulse (!) 101   Temp 36.8 °C (98.3 °F) (Oral)   Resp 16   Ht 1.626 m (5' 4\")   Wt 95.8 kg (211 lb 3.2 oz)   SpO2 90%           No acute distress  Dressing clean dry and intact  Neurovascularly intact  DF/PF intact    POD#2  S/P TKA right knee    Plan:  DVT Prophylaxis- TEDS/SCDs, ASA while in hospital, ASA 81 mg PO BID as outpatient  Weight Bearing Status-WBAT  PT/OT  DC home today  Patient has follow up appt.           "

## 2023-06-07 PROBLEM — G89.29 CHRONIC PAIN OF LEFT KNEE: Status: ACTIVE | Noted: 2023-06-07

## 2023-06-07 PROBLEM — M17.12 TRICOMPARTMENT OSTEOARTHRITIS OF LEFT KNEE: Status: ACTIVE | Noted: 2023-06-07

## 2023-06-07 PROBLEM — M25.562 CHRONIC PAIN OF LEFT KNEE: Status: ACTIVE | Noted: 2023-06-07

## 2023-06-21 ASSESSMENT — FIBROSIS 4 INDEX: FIB4 SCORE: 1.44

## 2023-06-23 ENCOUNTER — APPOINTMENT (OUTPATIENT)
Dept: ADMISSIONS | Facility: MEDICAL CENTER | Age: 78
End: 2023-06-23
Attending: ORTHOPAEDIC SURGERY
Payer: MEDICARE

## 2023-06-26 ENCOUNTER — PRE-ADMISSION TESTING (OUTPATIENT)
Dept: ADMISSIONS | Facility: MEDICAL CENTER | Age: 78
End: 2023-06-26
Attending: ORTHOPAEDIC SURGERY
Payer: MEDICARE

## 2023-06-26 VITALS — HEIGHT: 63 IN | BODY MASS INDEX: 39.6 KG/M2

## 2023-06-26 DIAGNOSIS — Z01.812 PRE-OPERATIVE LABORATORY EXAMINATION: ICD-10-CM

## 2023-06-26 NOTE — PREPROCEDURE INSTRUCTIONS
Telephone Pre admit appointment with patient and daughter Franchesca. History and medications reviewed. Pre-op instructions given, hand outs reviewed and questions answered. Pre admit video emailed.    Anesthesia fasting guidelines reviewed. Patient instructed to continue regularly prescribed medications through the day before surgery. Per anesthesia protocol, patient instructed to take these medications with a sip of water the day of surgery: zyrtec, allopurinol, tylenol prn, oxycodone prn.    Testing and  on 6/27.

## 2023-06-27 ENCOUNTER — APPOINTMENT (OUTPATIENT)
Dept: ADMISSIONS | Facility: MEDICAL CENTER | Age: 78
End: 2023-06-27
Attending: ORTHOPAEDIC SURGERY
Payer: MEDICARE

## 2023-06-27 DIAGNOSIS — Z01.812 PRE-OPERATIVE LABORATORY EXAMINATION: ICD-10-CM

## 2023-06-27 LAB
ANION GAP SERPL CALC-SCNC: 11 MMOL/L (ref 7–16)
BUN SERPL-MCNC: 20 MG/DL (ref 8–22)
CALCIUM SERPL-MCNC: 9.7 MG/DL (ref 8.4–10.2)
CHLORIDE SERPL-SCNC: 104 MMOL/L (ref 96–112)
CO2 SERPL-SCNC: 23 MMOL/L (ref 20–33)
CREAT SERPL-MCNC: 0.51 MG/DL (ref 0.5–1.4)
ERYTHROCYTE [DISTWIDTH] IN BLOOD BY AUTOMATED COUNT: 48.6 FL (ref 35.9–50)
GFR SERPLBLD CREATININE-BSD FMLA CKD-EPI: 96 ML/MIN/1.73 M 2
GLUCOSE SERPL-MCNC: 105 MG/DL (ref 65–99)
HCT VFR BLD AUTO: 44.6 % (ref 37–47)
HGB BLD-MCNC: 14.4 G/DL (ref 12–16)
MCH RBC QN AUTO: 30.8 PG (ref 27–33)
MCHC RBC AUTO-ENTMCNC: 32.3 G/DL (ref 32.2–35.5)
MCV RBC AUTO: 95.3 FL (ref 81.4–97.8)
PLATELET # BLD AUTO: 237 K/UL (ref 164–446)
PMV BLD AUTO: 8.5 FL (ref 9–12.9)
POTASSIUM SERPL-SCNC: 4.8 MMOL/L (ref 3.6–5.5)
RBC # BLD AUTO: 4.68 M/UL (ref 4.2–5.4)
SCCMEC + MECA PNL NOSE NAA+PROBE: NEGATIVE
SCCMEC + MECA PNL NOSE NAA+PROBE: NEGATIVE
SODIUM SERPL-SCNC: 138 MMOL/L (ref 135–145)
WBC # BLD AUTO: 6.2 K/UL (ref 4.8–10.8)

## 2023-06-27 PROCEDURE — 36415 COLL VENOUS BLD VENIPUNCTURE: CPT

## 2023-06-27 PROCEDURE — 87641 MR-STAPH DNA AMP PROBE: CPT

## 2023-06-27 PROCEDURE — 85027 COMPLETE CBC AUTOMATED: CPT

## 2023-06-27 PROCEDURE — 80048 BASIC METABOLIC PNL TOTAL CA: CPT

## 2023-06-27 PROCEDURE — 87640 STAPH A DNA AMP PROBE: CPT | Mod: XU

## 2023-06-27 NOTE — DISCHARGE PLANNING
DISCHARGE PLANNING NOTE - TOTAL JOINT    Procedure: Procedure(s):  ARTHROPLASTY, KNEE, TOTAL  Procedure Date: 7/13/2023  Insurance: Payor: MEDICARE / Plan: MEDICARE PART B ONLY / Product Type: *No Product type* /    Equipment currently available at home?  cane, front-wheel walker, and shower chair  Steps into the home? 1  Steps within the home? 0  Toilet height? ADA  Type of shower? walk-in shower  Who will be with you during your recovery? daughter  Is Outpatient Physical Therapy set up after surgery? Yes  Did you take the Total Joint Class and where? Yes, 4/23  Planning same day discharge?No     This writer met with pt and daughter, Franchesca,during their preadmission appointment. Pt educated to preop showers and nasal mrsa swab. Pt wants to stay one night, stayed two nights last time. Home safety sent home with pt. Pt educated to dc criteria. All questions answered and verbalizes understanding of all instructions. No dc needs identified at this time. Anticipate dc to home without barriers.

## 2023-07-12 ENCOUNTER — ANESTHESIA EVENT (OUTPATIENT)
Dept: SURGERY | Facility: MEDICAL CENTER | Age: 78
End: 2023-07-12
Payer: MEDICARE

## 2023-07-13 ENCOUNTER — HOSPITAL ENCOUNTER (OUTPATIENT)
Facility: MEDICAL CENTER | Age: 78
End: 2023-07-14
Attending: ORTHOPAEDIC SURGERY | Admitting: ORTHOPAEDIC SURGERY
Payer: MEDICARE

## 2023-07-13 ENCOUNTER — ANESTHESIA (OUTPATIENT)
Dept: SURGERY | Facility: MEDICAL CENTER | Age: 78
End: 2023-07-13
Payer: MEDICARE

## 2023-07-13 ENCOUNTER — APPOINTMENT (OUTPATIENT)
Dept: RADIOLOGY | Facility: MEDICAL CENTER | Age: 78
End: 2023-07-13
Attending: ORTHOPAEDIC SURGERY
Payer: MEDICARE

## 2023-07-13 DIAGNOSIS — M17.12 TRICOMPARTMENT OSTEOARTHRITIS OF LEFT KNEE: ICD-10-CM

## 2023-07-13 PROBLEM — Z96.652 S/P TOTAL KNEE REPLACEMENT, LEFT: Status: ACTIVE | Noted: 2023-07-13

## 2023-07-13 PROCEDURE — 160002 HCHG RECOVERY MINUTES (STAT): Performed by: ORTHOPAEDIC SURGERY

## 2023-07-13 PROCEDURE — 700111 HCHG RX REV CODE 636 W/ 250 OVERRIDE (IP): Performed by: STUDENT IN AN ORGANIZED HEALTH CARE EDUCATION/TRAINING PROGRAM

## 2023-07-13 PROCEDURE — G0378 HOSPITAL OBSERVATION PER HR: HCPCS

## 2023-07-13 PROCEDURE — 160048 HCHG OR STATISTICAL LEVEL 1-5: Performed by: ORTHOPAEDIC SURGERY

## 2023-07-13 PROCEDURE — 01402 ANES OPN/ARTH TOT KNE ARTHRP: CPT | Performed by: STUDENT IN AN ORGANIZED HEALTH CARE EDUCATION/TRAINING PROGRAM

## 2023-07-13 PROCEDURE — 73560 X-RAY EXAM OF KNEE 1 OR 2: CPT | Mod: LT

## 2023-07-13 PROCEDURE — 700101 HCHG RX REV CODE 250: Performed by: STUDENT IN AN ORGANIZED HEALTH CARE EDUCATION/TRAINING PROGRAM

## 2023-07-13 PROCEDURE — 700111 HCHG RX REV CODE 636 W/ 250 OVERRIDE (IP): Mod: JZ | Performed by: ORTHOPAEDIC SURGERY

## 2023-07-13 PROCEDURE — A9270 NON-COVERED ITEM OR SERVICE: HCPCS | Performed by: ORTHOPAEDIC SURGERY

## 2023-07-13 PROCEDURE — 160029 HCHG SURGERY MINUTES - 1ST 30 MINS LEVEL 4: Performed by: ORTHOPAEDIC SURGERY

## 2023-07-13 PROCEDURE — 160036 HCHG PACU - EA ADDL 30 MINS PHASE I: Performed by: ORTHOPAEDIC SURGERY

## 2023-07-13 PROCEDURE — 700102 HCHG RX REV CODE 250 W/ 637 OVERRIDE(OP): Performed by: STUDENT IN AN ORGANIZED HEALTH CARE EDUCATION/TRAINING PROGRAM

## 2023-07-13 PROCEDURE — 502000 HCHG MISC OR IMPLANTS RC 0278: Performed by: ORTHOPAEDIC SURGERY

## 2023-07-13 PROCEDURE — 160035 HCHG PACU - 1ST 60 MINS PHASE I: Performed by: ORTHOPAEDIC SURGERY

## 2023-07-13 PROCEDURE — 160041 HCHG SURGERY MINUTES - EA ADDL 1 MIN LEVEL 4: Performed by: ORTHOPAEDIC SURGERY

## 2023-07-13 PROCEDURE — 94760 N-INVAS EAR/PLS OXIMETRY 1: CPT

## 2023-07-13 PROCEDURE — 700102 HCHG RX REV CODE 250 W/ 637 OVERRIDE(OP): Performed by: ORTHOPAEDIC SURGERY

## 2023-07-13 PROCEDURE — 700105 HCHG RX REV CODE 258: Mod: JZ | Performed by: STUDENT IN AN ORGANIZED HEALTH CARE EDUCATION/TRAINING PROGRAM

## 2023-07-13 PROCEDURE — C1776 JOINT DEVICE (IMPLANTABLE): HCPCS | Performed by: ORTHOPAEDIC SURGERY

## 2023-07-13 PROCEDURE — 700111 HCHG RX REV CODE 636 W/ 250 OVERRIDE (IP): Mod: JZ | Performed by: STUDENT IN AN ORGANIZED HEALTH CARE EDUCATION/TRAINING PROGRAM

## 2023-07-13 PROCEDURE — 700105 HCHG RX REV CODE 258: Mod: JZ | Performed by: ORTHOPAEDIC SURGERY

## 2023-07-13 PROCEDURE — A9270 NON-COVERED ITEM OR SERVICE: HCPCS | Performed by: STUDENT IN AN ORGANIZED HEALTH CARE EDUCATION/TRAINING PROGRAM

## 2023-07-13 PROCEDURE — 99100 ANES PT EXTEME AGE<1 YR&>70: CPT | Performed by: STUDENT IN AN ORGANIZED HEALTH CARE EDUCATION/TRAINING PROGRAM

## 2023-07-13 PROCEDURE — 27447 TOTAL KNEE ARTHROPLASTY: CPT | Mod: LT | Performed by: ORTHOPAEDIC SURGERY

## 2023-07-13 PROCEDURE — 502240 HCHG MISC OR SUPPLY RC 0272: Performed by: ORTHOPAEDIC SURGERY

## 2023-07-13 PROCEDURE — 160009 HCHG ANES TIME/MIN: Performed by: ORTHOPAEDIC SURGERY

## 2023-07-13 PROCEDURE — 700101 HCHG RX REV CODE 250: Performed by: ORTHOPAEDIC SURGERY

## 2023-07-13 PROCEDURE — 64447 NJX AA&/STRD FEMORAL NRV IMG: CPT | Mod: 59,LT | Performed by: STUDENT IN AN ORGANIZED HEALTH CARE EDUCATION/TRAINING PROGRAM

## 2023-07-13 PROCEDURE — 64447 NJX AA&/STRD FEMORAL NRV IMG: CPT | Performed by: ORTHOPAEDIC SURGERY

## 2023-07-13 DEVICE — IMPLANTABLE DEVICE: Type: IMPLANTABLE DEVICE | Site: KNEE | Status: FUNCTIONAL

## 2023-07-13 RX ORDER — HYDROMORPHONE HYDROCHLORIDE 1 MG/ML
0.4 INJECTION, SOLUTION INTRAMUSCULAR; INTRAVENOUS; SUBCUTANEOUS
Status: DISCONTINUED | OUTPATIENT
Start: 2023-07-13 | End: 2023-07-13 | Stop reason: HOSPADM

## 2023-07-13 RX ORDER — DIPHENHYDRAMINE HYDROCHLORIDE 50 MG/ML
25 INJECTION INTRAMUSCULAR; INTRAVENOUS EVERY 6 HOURS PRN
Status: DISCONTINUED | OUTPATIENT
Start: 2023-07-13 | End: 2023-07-14 | Stop reason: HOSPADM

## 2023-07-13 RX ORDER — CELECOXIB 200 MG/1
200 CAPSULE ORAL ONCE
Status: COMPLETED | OUTPATIENT
Start: 2023-07-13 | End: 2023-07-13

## 2023-07-13 RX ORDER — VANCOMYCIN HYDROCHLORIDE 1 G/20ML
INJECTION, POWDER, LYOPHILIZED, FOR SOLUTION INTRAVENOUS
Status: COMPLETED | OUTPATIENT
Start: 2023-07-13 | End: 2023-07-13

## 2023-07-13 RX ORDER — MIDAZOLAM HYDROCHLORIDE 1 MG/ML
INJECTION INTRAMUSCULAR; INTRAVENOUS PRN
Status: DISCONTINUED | OUTPATIENT
Start: 2023-07-13 | End: 2023-07-13 | Stop reason: SURG

## 2023-07-13 RX ORDER — POLYETHYLENE GLYCOL 3350 17 G/17G
1 POWDER, FOR SOLUTION ORAL 2 TIMES DAILY PRN
Status: DISCONTINUED | OUTPATIENT
Start: 2023-07-13 | End: 2023-07-14 | Stop reason: HOSPADM

## 2023-07-13 RX ORDER — HALOPERIDOL 5 MG/ML
1 INJECTION INTRAMUSCULAR EVERY 6 HOURS PRN
Status: DISCONTINUED | OUTPATIENT
Start: 2023-07-13 | End: 2023-07-14 | Stop reason: HOSPADM

## 2023-07-13 RX ORDER — DIPHENHYDRAMINE HYDROCHLORIDE 50 MG/ML
12.5 INJECTION INTRAMUSCULAR; INTRAVENOUS
Status: DISCONTINUED | OUTPATIENT
Start: 2023-07-13 | End: 2023-07-13 | Stop reason: HOSPADM

## 2023-07-13 RX ORDER — EPHEDRINE SULFATE 50 MG/ML
5 INJECTION, SOLUTION INTRAVENOUS
Status: DISCONTINUED | OUTPATIENT
Start: 2023-07-13 | End: 2023-07-13 | Stop reason: HOSPADM

## 2023-07-13 RX ORDER — ONDANSETRON 2 MG/ML
4 INJECTION INTRAMUSCULAR; INTRAVENOUS
Status: DISCONTINUED | OUTPATIENT
Start: 2023-07-13 | End: 2023-07-13 | Stop reason: HOSPADM

## 2023-07-13 RX ORDER — HYDROMORPHONE HYDROCHLORIDE 1 MG/ML
0.5 INJECTION, SOLUTION INTRAMUSCULAR; INTRAVENOUS; SUBCUTANEOUS
Status: DISCONTINUED | OUTPATIENT
Start: 2023-07-13 | End: 2023-07-14 | Stop reason: HOSPADM

## 2023-07-13 RX ORDER — ENEMA 19; 7 G/133ML; G/133ML
1 ENEMA RECTAL
Status: DISCONTINUED | OUTPATIENT
Start: 2023-07-13 | End: 2023-07-14 | Stop reason: HOSPADM

## 2023-07-13 RX ORDER — ACETAMINOPHEN 500 MG
1000 TABLET ORAL ONCE
Status: COMPLETED | OUTPATIENT
Start: 2023-07-13 | End: 2023-07-13

## 2023-07-13 RX ORDER — OXYCODONE HCL 5 MG/5 ML
5 SOLUTION, ORAL ORAL
Status: COMPLETED | OUTPATIENT
Start: 2023-07-13 | End: 2023-07-13

## 2023-07-13 RX ORDER — SCOLOPAMINE TRANSDERMAL SYSTEM 1 MG/1
1 PATCH, EXTENDED RELEASE TRANSDERMAL
Status: DISCONTINUED | OUTPATIENT
Start: 2023-07-13 | End: 2023-07-14 | Stop reason: HOSPADM

## 2023-07-13 RX ORDER — ASPIRIN 81 MG/1
81 TABLET ORAL 2 TIMES DAILY
Status: DISCONTINUED | OUTPATIENT
Start: 2023-07-13 | End: 2023-07-14 | Stop reason: HOSPADM

## 2023-07-13 RX ORDER — HALOPERIDOL 5 MG/ML
1 INJECTION INTRAMUSCULAR
Status: DISCONTINUED | OUTPATIENT
Start: 2023-07-13 | End: 2023-07-13 | Stop reason: HOSPADM

## 2023-07-13 RX ORDER — EPHEDRINE SULFATE 50 MG/ML
INJECTION, SOLUTION INTRAVENOUS PRN
Status: DISCONTINUED | OUTPATIENT
Start: 2023-07-13 | End: 2023-07-13 | Stop reason: SURG

## 2023-07-13 RX ORDER — OXYCODONE HCL 10 MG/1
10 TABLET, FILM COATED, EXTENDED RELEASE ORAL ONCE
Status: COMPLETED | OUTPATIENT
Start: 2023-07-13 | End: 2023-07-13

## 2023-07-13 RX ORDER — LIDOCAINE HYDROCHLORIDE 20 MG/ML
INJECTION, SOLUTION EPIDURAL; INFILTRATION; INTRACAUDAL; PERINEURAL PRN
Status: DISCONTINUED | OUTPATIENT
Start: 2023-07-13 | End: 2023-07-13 | Stop reason: SURG

## 2023-07-13 RX ORDER — SODIUM CHLORIDE, SODIUM LACTATE, POTASSIUM CHLORIDE, CALCIUM CHLORIDE 600; 310; 30; 20 MG/100ML; MG/100ML; MG/100ML; MG/100ML
INJECTION, SOLUTION INTRAVENOUS CONTINUOUS
Status: ACTIVE | OUTPATIENT
Start: 2023-07-13 | End: 2023-07-13

## 2023-07-13 RX ORDER — BUPIVACAINE HYDROCHLORIDE AND EPINEPHRINE 5; 5 MG/ML; UG/ML
INJECTION, SOLUTION EPIDURAL; INTRACAUDAL; PERINEURAL
Status: DISCONTINUED | OUTPATIENT
Start: 2023-07-13 | End: 2023-07-13 | Stop reason: HOSPADM

## 2023-07-13 RX ORDER — OXYCODONE HYDROCHLORIDE 5 MG/1
5 TABLET ORAL
Status: DISCONTINUED | OUTPATIENT
Start: 2023-07-13 | End: 2023-07-14 | Stop reason: HOSPADM

## 2023-07-13 RX ORDER — SODIUM CHLORIDE, SODIUM LACTATE, POTASSIUM CHLORIDE, CALCIUM CHLORIDE 600; 310; 30; 20 MG/100ML; MG/100ML; MG/100ML; MG/100ML
INJECTION, SOLUTION INTRAVENOUS
Status: DISCONTINUED | OUTPATIENT
Start: 2023-07-13 | End: 2023-07-13 | Stop reason: SURG

## 2023-07-13 RX ORDER — BISACODYL 10 MG
10 SUPPOSITORY, RECTAL RECTAL
Status: DISCONTINUED | OUTPATIENT
Start: 2023-07-13 | End: 2023-07-14 | Stop reason: HOSPADM

## 2023-07-13 RX ORDER — HYDROMORPHONE HYDROCHLORIDE 1 MG/ML
0.2 INJECTION, SOLUTION INTRAMUSCULAR; INTRAVENOUS; SUBCUTANEOUS
Status: DISCONTINUED | OUTPATIENT
Start: 2023-07-13 | End: 2023-07-13 | Stop reason: HOSPADM

## 2023-07-13 RX ORDER — OXYCODONE HCL 5 MG/5 ML
10 SOLUTION, ORAL ORAL
Status: COMPLETED | OUTPATIENT
Start: 2023-07-13 | End: 2023-07-13

## 2023-07-13 RX ORDER — AMOXICILLIN 250 MG
1 CAPSULE ORAL NIGHTLY
Status: DISCONTINUED | OUTPATIENT
Start: 2023-07-13 | End: 2023-07-14 | Stop reason: HOSPADM

## 2023-07-13 RX ORDER — DEXAMETHASONE SODIUM PHOSPHATE 4 MG/ML
4 INJECTION, SOLUTION INTRA-ARTICULAR; INTRALESIONAL; INTRAMUSCULAR; INTRAVENOUS; SOFT TISSUE
Status: DISCONTINUED | OUTPATIENT
Start: 2023-07-13 | End: 2023-07-14 | Stop reason: HOSPADM

## 2023-07-13 RX ORDER — ONDANSETRON 2 MG/ML
INJECTION INTRAMUSCULAR; INTRAVENOUS PRN
Status: DISCONTINUED | OUTPATIENT
Start: 2023-07-13 | End: 2023-07-13 | Stop reason: SURG

## 2023-07-13 RX ORDER — HYDRALAZINE HYDROCHLORIDE 20 MG/ML
5 INJECTION INTRAMUSCULAR; INTRAVENOUS
Status: DISCONTINUED | OUTPATIENT
Start: 2023-07-13 | End: 2023-07-13 | Stop reason: HOSPADM

## 2023-07-13 RX ORDER — DOCUSATE SODIUM 100 MG/1
100 CAPSULE, LIQUID FILLED ORAL 2 TIMES DAILY
Status: DISCONTINUED | OUTPATIENT
Start: 2023-07-13 | End: 2023-07-14 | Stop reason: HOSPADM

## 2023-07-13 RX ORDER — ONDANSETRON 2 MG/ML
4 INJECTION INTRAMUSCULAR; INTRAVENOUS EVERY 4 HOURS PRN
Status: DISCONTINUED | OUTPATIENT
Start: 2023-07-13 | End: 2023-07-14 | Stop reason: HOSPADM

## 2023-07-13 RX ORDER — OXYCODONE HYDROCHLORIDE 10 MG/1
10 TABLET ORAL
Status: DISCONTINUED | OUTPATIENT
Start: 2023-07-13 | End: 2023-07-14 | Stop reason: HOSPADM

## 2023-07-13 RX ORDER — DEXAMETHASONE SODIUM PHOSPHATE 4 MG/ML
INJECTION, SOLUTION INTRA-ARTICULAR; INTRALESIONAL; INTRAMUSCULAR; INTRAVENOUS; SOFT TISSUE PRN
Status: DISCONTINUED | OUTPATIENT
Start: 2023-07-13 | End: 2023-07-13 | Stop reason: SURG

## 2023-07-13 RX ORDER — LOSARTAN POTASSIUM 50 MG/1
50 TABLET ORAL DAILY
Status: DISCONTINUED | OUTPATIENT
Start: 2023-07-13 | End: 2023-07-14 | Stop reason: HOSPADM

## 2023-07-13 RX ORDER — ALLOPURINOL 300 MG/1
300 TABLET ORAL DAILY
Status: DISCONTINUED | OUTPATIENT
Start: 2023-07-13 | End: 2023-07-14 | Stop reason: HOSPADM

## 2023-07-13 RX ORDER — CEFAZOLIN SODIUM 1 G/3ML
2 INJECTION, POWDER, FOR SOLUTION INTRAMUSCULAR; INTRAVENOUS ONCE
Status: DISCONTINUED | OUTPATIENT
Start: 2023-07-13 | End: 2023-07-13 | Stop reason: HOSPADM

## 2023-07-13 RX ORDER — ROCURONIUM BROMIDE 10 MG/ML
INJECTION, SOLUTION INTRAVENOUS PRN
Status: DISCONTINUED | OUTPATIENT
Start: 2023-07-13 | End: 2023-07-13 | Stop reason: SURG

## 2023-07-13 RX ORDER — HYDROMORPHONE HYDROCHLORIDE 1 MG/ML
0.1 INJECTION, SOLUTION INTRAMUSCULAR; INTRAVENOUS; SUBCUTANEOUS
Status: DISCONTINUED | OUTPATIENT
Start: 2023-07-13 | End: 2023-07-13 | Stop reason: HOSPADM

## 2023-07-13 RX ORDER — TRANEXAMIC ACID 100 MG/ML
INJECTION, SOLUTION INTRAVENOUS PRN
Status: DISCONTINUED | OUTPATIENT
Start: 2023-07-13 | End: 2023-07-13 | Stop reason: SURG

## 2023-07-13 RX ORDER — SODIUM CHLORIDE, SODIUM LACTATE, POTASSIUM CHLORIDE, CALCIUM CHLORIDE 600; 310; 30; 20 MG/100ML; MG/100ML; MG/100ML; MG/100ML
INJECTION, SOLUTION INTRAVENOUS CONTINUOUS
Status: DISCONTINUED | OUTPATIENT
Start: 2023-07-13 | End: 2023-07-13 | Stop reason: HOSPADM

## 2023-07-13 RX ORDER — AMOXICILLIN 250 MG
1 CAPSULE ORAL
Status: DISCONTINUED | OUTPATIENT
Start: 2023-07-13 | End: 2023-07-14 | Stop reason: HOSPADM

## 2023-07-13 RX ORDER — ROPIVACAINE HYDROCHLORIDE 5 MG/ML
INJECTION, SOLUTION EPIDURAL; INFILTRATION; PERINEURAL
Status: COMPLETED | OUTPATIENT
Start: 2023-07-13 | End: 2023-07-13

## 2023-07-13 RX ORDER — CEFAZOLIN SODIUM 1 G/3ML
INJECTION, POWDER, FOR SOLUTION INTRAMUSCULAR; INTRAVENOUS PRN
Status: DISCONTINUED | OUTPATIENT
Start: 2023-07-13 | End: 2023-07-13 | Stop reason: SURG

## 2023-07-13 RX ORDER — LIDOCAINE HYDROCHLORIDE 40 MG/ML
SOLUTION TOPICAL PRN
Status: DISCONTINUED | OUTPATIENT
Start: 2023-07-13 | End: 2023-07-13 | Stop reason: SURG

## 2023-07-13 RX ADMIN — LIDOCAINE HYDROCHLORIDE 4 ML: 40 SOLUTION TOPICAL at 10:43

## 2023-07-13 RX ADMIN — EPHEDRINE SULFATE 10 MG: 50 INJECTION, SOLUTION INTRAVENOUS at 10:49

## 2023-07-13 RX ADMIN — EPHEDRINE SULFATE 10 MG: 50 INJECTION, SOLUTION INTRAVENOUS at 10:51

## 2023-07-13 RX ADMIN — LOSARTAN POTASSIUM 50 MG: 50 TABLET, FILM COATED ORAL at 14:29

## 2023-07-13 RX ADMIN — OXYCODONE HYDROCHLORIDE 5 MG: 5 SOLUTION ORAL at 12:13

## 2023-07-13 RX ADMIN — SODIUM CHLORIDE, POTASSIUM CHLORIDE, SODIUM LACTATE AND CALCIUM CHLORIDE: 600; 310; 30; 20 INJECTION, SOLUTION INTRAVENOUS at 09:54

## 2023-07-13 RX ADMIN — LIDOCAINE HYDROCHLORIDE 100 MG: 20 INJECTION, SOLUTION EPIDURAL; INFILTRATION; INTRACAUDAL at 10:42

## 2023-07-13 RX ADMIN — OXYCODONE HYDROCHLORIDE 10 MG: 10 TABLET ORAL at 22:48

## 2023-07-13 RX ADMIN — DEXAMETHASONE SODIUM PHOSPHATE 8 MG: 4 INJECTION INTRA-ARTICULAR; INTRALESIONAL; INTRAMUSCULAR; INTRAVENOUS; SOFT TISSUE at 10:52

## 2023-07-13 RX ADMIN — ASPIRIN 81 MG: 81 TABLET, COATED ORAL at 18:02

## 2023-07-13 RX ADMIN — FENTANYL CITRATE 50 MCG: 50 INJECTION, SOLUTION INTRAMUSCULAR; INTRAVENOUS at 11:01

## 2023-07-13 RX ADMIN — ACETAMINOPHEN 1000 MG: 500 TABLET ORAL at 09:54

## 2023-07-13 RX ADMIN — ALLOPURINOL 300 MG: 300 TABLET ORAL at 14:28

## 2023-07-13 RX ADMIN — OXYCODONE 5 MG: 5 TABLET ORAL at 14:30

## 2023-07-13 RX ADMIN — SUGAMMADEX 200 MG: 100 INJECTION, SOLUTION INTRAVENOUS at 11:41

## 2023-07-13 RX ADMIN — CEFAZOLIN 2 G: 1 INJECTION, POWDER, FOR SOLUTION INTRAMUSCULAR; INTRAVENOUS at 10:52

## 2023-07-13 RX ADMIN — TRANEXAMIC ACID 2000 MG: 100 INJECTION, SOLUTION INTRAVENOUS at 11:29

## 2023-07-13 RX ADMIN — ROPIVACAINE HYDROCHLORIDE 20 ML: 5 INJECTION EPIDURAL; INFILTRATION; PERINEURAL at 10:25

## 2023-07-13 RX ADMIN — TRANEXAMIC ACID 1000 MG: 100 INJECTION, SOLUTION INTRAVENOUS at 10:52

## 2023-07-13 RX ADMIN — FENTANYL CITRATE 100 MCG: 50 INJECTION, SOLUTION INTRAMUSCULAR; INTRAVENOUS at 10:42

## 2023-07-13 RX ADMIN — ROCURONIUM BROMIDE 50 MG: 50 INJECTION, SOLUTION INTRAVENOUS at 10:43

## 2023-07-13 RX ADMIN — PROPOFOL 130 MG: 10 INJECTION, EMULSION INTRAVENOUS at 10:42

## 2023-07-13 RX ADMIN — CELECOXIB 200 MG: 200 CAPSULE ORAL at 09:54

## 2023-07-13 RX ADMIN — FENTANYL CITRATE 25 MCG: 50 INJECTION, SOLUTION INTRAMUSCULAR; INTRAVENOUS at 12:22

## 2023-07-13 RX ADMIN — OXYCODONE 5 MG: 5 TABLET ORAL at 18:02

## 2023-07-13 RX ADMIN — FENTANYL CITRATE 25 MCG: 50 INJECTION, SOLUTION INTRAMUSCULAR; INTRAVENOUS at 12:13

## 2023-07-13 RX ADMIN — OXYCODONE HYDROCHLORIDE 10 MG: 10 TABLET, FILM COATED, EXTENDED RELEASE ORAL at 09:54

## 2023-07-13 RX ADMIN — MIDAZOLAM 1 MG: 1 INJECTION, SOLUTION INTRAMUSCULAR; INTRAVENOUS at 10:25

## 2023-07-13 RX ADMIN — SODIUM CHLORIDE, POTASSIUM CHLORIDE, SODIUM LACTATE AND CALCIUM CHLORIDE: 600; 310; 30; 20 INJECTION, SOLUTION INTRAVENOUS at 10:37

## 2023-07-13 RX ADMIN — ONDANSETRON 4 MG: 2 INJECTION INTRAMUSCULAR; INTRAVENOUS at 11:29

## 2023-07-13 ASSESSMENT — LIFESTYLE VARIABLES
CONSUMPTION TOTAL: NEGATIVE
TOTAL SCORE: 0
ON A TYPICAL DAY WHEN YOU DRINK ALCOHOL HOW MANY DRINKS DO YOU HAVE: 0
HAVE YOU EVER FELT YOU SHOULD CUT DOWN ON YOUR DRINKING: NO
EVER FELT BAD OR GUILTY ABOUT YOUR DRINKING: NO
HAVE PEOPLE ANNOYED YOU BY CRITICIZING YOUR DRINKING: NO
TOTAL SCORE: 0
TOTAL SCORE: 0
AVERAGE NUMBER OF DAYS PER WEEK YOU HAVE A DRINK CONTAINING ALCOHOL: 0
ALCOHOL_USE: NO
HOW MANY TIMES IN THE PAST YEAR HAVE YOU HAD 5 OR MORE DRINKS IN A DAY: 0
EVER HAD A DRINK FIRST THING IN THE MORNING TO STEADY YOUR NERVES TO GET RID OF A HANGOVER: NO

## 2023-07-13 ASSESSMENT — PATIENT HEALTH QUESTIONNAIRE - PHQ9
1. LITTLE INTEREST OR PLEASURE IN DOING THINGS: NOT AT ALL
2. FEELING DOWN, DEPRESSED, IRRITABLE, OR HOPELESS: NOT AT ALL
SUM OF ALL RESPONSES TO PHQ9 QUESTIONS 1 AND 2: 0
1. LITTLE INTEREST OR PLEASURE IN DOING THINGS: NOT AT ALL
1. LITTLE INTEREST OR PLEASURE IN DOING THINGS: NOT AT ALL
SUM OF ALL RESPONSES TO PHQ9 QUESTIONS 1 AND 2: 0
2. FEELING DOWN, DEPRESSED, IRRITABLE, OR HOPELESS: NOT AT ALL
2. FEELING DOWN, DEPRESSED, IRRITABLE, OR HOPELESS: NOT AT ALL
SUM OF ALL RESPONSES TO PHQ9 QUESTIONS 1 AND 2: 0

## 2023-07-13 ASSESSMENT — COGNITIVE AND FUNCTIONAL STATUS - GENERAL
SUGGESTED CMS G CODE MODIFIER MOBILITY: CK
SUGGESTED CMS G CODE MODIFIER DAILY ACTIVITY: CK
MOVING FROM LYING ON BACK TO SITTING ON SIDE OF FLAT BED: A LITTLE
STANDING UP FROM CHAIR USING ARMS: A LITTLE
PERSONAL GROOMING: A LITTLE
CLIMB 3 TO 5 STEPS WITH RAILING: A LITTLE
TURNING FROM BACK TO SIDE WHILE IN FLAT BAD: A LITTLE
WALKING IN HOSPITAL ROOM: A LITTLE
DAILY ACTIVITIY SCORE: 19
DRESSING REGULAR UPPER BODY CLOTHING: A LITTLE
HELP NEEDED FOR BATHING: A LITTLE
MOBILITY SCORE: 18
TOILETING: A LITTLE
MOVING TO AND FROM BED TO CHAIR: A LITTLE
DRESSING REGULAR LOWER BODY CLOTHING: A LITTLE

## 2023-07-13 ASSESSMENT — PAIN DESCRIPTION - PAIN TYPE
TYPE: SURGICAL PAIN
TYPE: SURGICAL PAIN
TYPE: CHRONIC PAIN
TYPE: SURGICAL PAIN
TYPE: SURGICAL PAIN

## 2023-07-13 ASSESSMENT — FIBROSIS 4 INDEX
FIB4 SCORE: 1.83
FIB4 SCORE: 1.83

## 2023-07-13 ASSESSMENT — PAIN SCALES - GENERAL: PAIN_LEVEL: 1

## 2023-07-13 NOTE — ANESTHESIA POSTPROCEDURE EVALUATION
Patient: Shital Saini Acla    Procedure Summary     Date: 07/13/23 Room / Location:  OR  / SURGERY [facility]    Anesthesia Start: 1037 Anesthesia Stop: 1153    Procedure: ARTHROPLASTY, KNEE, TOTAL (Left: Knee) Diagnosis:       Chronic pain of left knee      Tricompartment osteoarthritis of left knee      (Chronic pain of left knee [M25.562, G89.29] Tricompartment osteoarthritis of left knee [M17.12])    Surgeons: Srinivasan Salinas M.D. Responsible Provider: Stacy Hendricks M.D.    Anesthesia Type: general, peripheral nerve block ASA Status: 3          Final Anesthesia Type: general, peripheral nerve block  Last vitals  BP   Blood Pressure : (!) 151/72    Temp   36.3 °C (97.3 °F)    Pulse   87   Resp   16    SpO2   94 %      Anesthesia Post Evaluation    Patient location during evaluation: PACU  Patient participation: complete - patient participated  Level of consciousness: awake  Pain score: 1    Airway patency: patent  Anesthetic complications: no  Cardiovascular status: hemodynamically stable  Respiratory status: acceptable and face mask  Hydration status: euvolemic    PONV: none          No notable events documented.     Nurse Pain Score: 0 (NPRS)

## 2023-07-13 NOTE — PROGRESS NOTES
4 Eyes Skin Assessment Completed by CAROLIN Pierson and CAROLIN Vieira.    Head WDL  Ears WDL  Nose WDL  Mouth WDL  Neck WDL  Breast/Chest WDL  Shoulder Blades WDL  Spine WDL  (R) Arm/Elbow/Hand WDL  (L) Arm/Elbow/Hand WDL  Abdomen WDL  Groin WDL  Scrotum/Coccyx/Buttocks WDL  (R) Leg/knee Incision, swelling  (L) Leg old scar, swelling  (R) Heel/Foot/Toe WDL  (L) Heel/Foot/Toe WDL          Devices In Places Blood Pressure Cuff, Pulse Ox, SCD's, and Nasal Cannula      Interventions In Place NC W/Ear Foams and Pillows    Possible Skin Injury No    Pictures Uploaded Into Epic N/A  Wound Consult Placed N/A  RN Wound Prevention Protocol Ordered No

## 2023-07-13 NOTE — ANESTHESIA PROCEDURE NOTES
Airway    Date/Time: 7/13/2023 10:43 AM    Performed by: Stacy Hendricks M.D.  Authorized by: Stacy Hendricks M.D.    Location:  OR  Urgency:  Elective  Indications for Airway Management:  Anesthesia      Spontaneous Ventilation: absent    Sedation Level:  Deep  Preoxygenated: Yes    Patient Position:  Sniffing  Mask Difficulty Assessment:  1 - vent by mask  Final Airway Type:  Endotracheal airway  Final Endotracheal Airway:  ETT  Cuffed: Yes    Technique Used for Successful ETT Placement:  Direct laryngoscopy    Insertion Site:  Oral  Blade Type:  Gonzales  Laryngoscope Blade/Videolaryngoscope Blade Size:  3  ETT Size (mm):  7.0  Measured from:  Teeth  ETT to Teeth (cm):  20  Placement Verified by: auscultation and capnometry    Cormack-Lehane Classification:  Grade I - full view of glottis  Number of Attempts at Approach:  1

## 2023-07-13 NOTE — OP REPORT
DATE OF SERVICE: 7/13/23    PREOPERATIVE DIAGNOSES:  1. left knee advanced tricompartmental arthritis.     POSTOPERATIVE DIAGNOSES:  1. left knee advanced tricompartmental arthritis.     PROCEDURE PERFORMED: left total knee arthroplasty    SURGEON: Srinivasan Salinas MD.     ASSISTANT: SPENCER Martinez    ANESTHESIA: General with Adductor canal femoral nerve block for postoperative pain control.     ANESTHESIOLOGIST: MD Eladio    ANTIBIOTICS: Ancef and Vancomycin.     IMPLANTS: Wake Triathlon system, size 2 femur, 2 tibial baseplate, 12 PS poly, and 31 patellar button      COMPLICATIONS: None    BLOOD LOSS: 100    INDICATIONS: The patient presents with a chief complaint of knee pain recalcitrant to conservative treatment. The patient has advanced knee arthritis. The risks of the surgery were discussed at length. All questions were answered, and no guarantees given. The patient elected to proceed with the proposed procedure.     DESCRIPTION OF PROCEDURE: The patient was properly identified in the preoperative holding room and the left knee was marked as the correct surgical site. The patient was taken to the operative suite and placed in supine on the operative table. The patient underwent general anesthesia. After review of allergies, antibiotics were administered, a time out was called. The left knee and lower extremity was sterilely prepped and draped in standard fashion. The limb was exsanguinated and tourniquet was inflated to 250mmHG. A standard midline incision was made followed by medial patellar arthrotomy. The medial and lateral meniscus were removed as well as the ACL and the PCL.  There was significant tricompartmental arthritis. The knee was placed in 90 degrees of flexion. A drill hole was made on the distal femur. The distal cut was made.  Anterior referencing measured size 2. This was drilled in 3 degrees of external rotation and was followed by a 4-in-1 cutting block and the notch block followed by  intramedullary tibial guide after checking appropriate rotations, slope and height. The tibia was drilled and pinned in place. The proximal tibia cut was performed. Appropriate soft tissue balance at 0 - 30 degrees. A size 2  tibial base had good coverage without overhang, was repaired and trialed, brought in extension with a 11 poly liner. The patella was measured with a caliper, a saw cut was made, drilled for a 31 button. The femoral punch and tibial punch was used. The trial implants were removed. The implants were inserted with excellent pressfit bone fixation, first the tibia, then the femur, brought in extension with # 11 poly and the the patella. This was soaked with betadine and saline and the tourniquet was deflated. Good hemostasis was obtained. Once again, retrialed and the final 12 PS poly was used, irrigated and then closed in flexion with with # 2 Quill, reinforced with # 1 Vicryl, 2-0 Vicryl and staples on skin. All counts were correct. SPENCER Martinez, was present throughout the operation and key essential part of the success of the operation assisting with patient positioning, instrumentation, retraction, and closure.

## 2023-07-13 NOTE — LETTER
June 22, 2023    Patient Name: Shital Saini Acla  Surgeon Name: Srinivasan Salinas M.D.  Surgery Facility: [facility] ([address] [facility])  Surgery Date: 7/13/2023    The time of your surgery is not final and may change up to and until the day of your surgery. You will be contacted 24-48 hours prior to your surgery date with your check-in and surgery time.    If you will not be at one of the below numbers please call the surgery scheduler at [phone]  Preferred Phone: [phone]    BEFORE YOUR SURGERY   Pre Registration and/or Lab Work must be done within and no earlier than 28 days prior to your surgery date. Please call [facility] at [phone] for an appointment as soon as possible.    Pre op Appointment:   Date: 07/12/23   Time: 9:00AM   Provider: Srinivasan Salinas MD   Location: [address] [place] [id number]  Instructions: Bring a list of all medications you are taking including the dosing and frequency.    DAY OF YOUR SURGERY  Nothing to eat or drink after midnight     Refrain from smoking any substance after midnight prior to surgery. Smoking may interfere with the anesthetic and frequently produces nausea during the recovery period.    Continue taking all lifesaving medications. Including the morning of your surgery with small sip of water.    Please do NOT take on the day of surgery:  Diuretics: examples- furosemide (Lasix), spironolactone, hydrochlorothiazide  ACE-inhibitors: examples- lisinopril, ramipril, enalapril  “ARBs”: examples- losartan, Olmesartan, valsartan    Please arrive at the hospital/surgery center at the check-in time provided.     An adult will need to bring you and take you home after your surgery.           AFTER YOUR SURGERY  Post op Appointment:   Date: 07/28/23   Time: 8:45AM   With: Srinivasan Salinas MD   Location: [address] [place] [id number]    - Therapy- Your first appointment should be 3  day(s) after  your surgery. For your convenience we have 4 Physical Therapy locations: [facility], [place], and [facility]. Call our office ASAP to schedule an appointment at [phone] or take the enclosed Therapy Prescription to a facility of your choice.  - Post Surgery - You will need someone to drive you home  - Post Surgery - You will need someone to stay with you for 24 hours    TIME OFF WORK  FMLA or Disability forms can be faxed directly to: [phone] or you may drop them off at [address] [place] [address]. Our office charges a $35.00 fee per form. Forms will be completed within 10 business days of drop off and payment received. For the status of your forms you may contact our disability office directly [phone].    MEDICATION INSTRUCTIONS **Please read section completely**    The following medications should be stopped a minimum of 10 days prior to surgery:  All over the counter, Supplements & Herbal medications    Anorectics: Phentermine (Adipex-P, Lomaira and Suprenza), Phentermine-topiramate (Qsymia), Bupropion-naltrexone (Contrave)    Opiod Partial Agonists/Opioid Antagonists: Buprenorphine (Subocone, Belbuca, Butrans, Probuphine Implant, Sublocade), Naltrexone (ReVia, Vivitrol), Naloxone    Amphetamines: Dextroamphetamine/Amphetamine (Adderall, Mydayis), Methylphenidate Hydrochloride (Concerta, Metadate, Methylin, Ritalin)    The following medications should be stopped 5 days prior to surgery:  Blood Thinners: Any Aspirin, Aspirin products, anti-inflammatories such as ibuprofen and any blood thinners such as Coumadin and Plavix. Please consult your prescribing physician if you are on life saving blood thinners, in regards to when to stop medications prior to surgery.     The following medications should be stopped a minimum of 3 days prior to surgery:  PDE-5 inhibitors: Sildenafil (Viagra), Tadalafil (Cialis), Vardenafil (Levitra), Avanafil (Stendra)    MAO Inhibitors: Rasagiline  (Azilect), Selegiline (Eldepryl, Emsam, Selapar), Isocarboxazid (Marplan), Phenelzine (Nardil)         COVID and INFLUENZA NOTICE TO PATIENTS    Currently, the [facility] does not routinely test patients for COVID-19 or Influenza prior to their elective surgery.  However, if patients develop the following symptoms prior to their surgery date, they should voluntarily test for COVID-19 and Influenza and notify the surgical office of their condition and results.  The symptoms warranting testing would be any two of the following:    Fever (Temp above 100.4 F)  Chills  Cough  Shortness of breath or difficulty breathing  Fatigue  Myalgias (muscle or body aches)  Headache  Sore Throat  Congestion or Runny Nose  Nausea or vomiting  Diarrhea  New loss of taste or smell    Having these symptoms prior to surgery can significantly increase your risk of morbidity and mortality under anesthesia, which may compromise your health and require a transfer to a hospital for a higher level of care.  Therefore, it is advisable to notify the surgical team of any illness in order to get information for the appropriate time to delay the surgery to minimize these preventable risks.    Your health and safety are our number one priority at the [facility], and we are thankful that you entrust us with your care.  Please help us ensure the best possible surgical and anesthetic outcome by sharing appropriate health information with our perioperative team and staff.  We look forward to taking great care of you!    Thank you for your time and consideration on this matter.    Adam Chamberlain MD  Medical Director  Anesthesiologist  [facility]

## 2023-07-13 NOTE — H&P
Surgery Orthopedic History & Physical Note    Date  7/13/2023    Primary Care Physician  Eyal Pastor M.D.    CC  Pre-Op Diagnosis Codes:     * Chronic pain of left knee [M25.562, G89.29]     * Tricompartment osteoarthritis of left knee [M17.12]    HPI  This is a 77 y.o. female who presented with left knee pain secondary to advanced djd.  She elects to undergo a left TKA.    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       Social History     Socioeconomic History    Marital status:      Spouse name: Not on file    Number of children: Not on file    Years of education: Not on file    Highest education level: Not on file   Occupational History    Not on file   Tobacco Use    Smoking status: Never    Smokeless tobacco: Never   Vaping Use    Vaping Use: Never used   Substance and Sexual Activity    Alcohol use: Never    Drug use: Never    Sexual activity: Not Currently   Other Topics Concern    Not on file   Social History Narrative    Not on file     Social Determinants of Health     Financial Resource Strain: Not on file   Food Insecurity: Not on file   Transportation Needs: Not on file   Physical Activity: Not on file   Stress: Not on file   Social Connections: Not on file   Intimate Partner Violence: Not on file   Housing  Stability: Not on file       Family History   Problem Relation Age of Onset    Hypertension Mother        Allergies  Patient has no known allergies.    Review of Systems  Negative    Physical Exam  Lungs:  CTA bilat  CV:  RRR  Left knee:  pain and crepitus with range of motion  Vital Signs                          Labs:                    Radiology:  No orders to display         Assessment/Plan:  Pre-Op Diagnosis Codes:     * Chronic pain of left knee [M25.562, G89.29]     * Tricompartment osteoarthritis of left knee [M17.12]  Procedure(s):  ARTHROPLASTY, KNEE, TOTAL

## 2023-07-13 NOTE — OR NURSING
"1149: To PACU from OR via gurbrielle,  sleeping, respirations spontaneous and non-labored via OPA. Stable on 6L O2 via mask. Ice pack applied over c/d/i left knee surgical dressings. LLE CMS intact. Plan to keep pt in PACU for full hour per [facility]ANG protocol.     1205: Pt sleeping comfortably at this time. Remains stable on 6L O2 via mask.     1220: Pt c/o \"a lot of discomfort\", no number scale provided. Medicated per prn orders. Denies nausea. Tolerating po fluids.     1235: Pt states pain is more tolerable. Declines pain medication at this time. Stable on 2L NC.    1245: Updated pt daughter via phone. Pt resting comfortably at this time.     1300: Pt resting comfortably. Waiting xray. Meets criteria to transfer to floor.     1330: Waiting for xray    1351; Transferred on O2 tank @ 465           "

## 2023-07-13 NOTE — ANESTHESIA PROCEDURE NOTES
Peripheral Block    Date/Time: 7/13/2023 10:25 AM    Performed by: Stacy Hendricks M.D.  Authorized by: Stacy Hendricks M.D.    Patient Location:  Pre-op  Start Time:  7/13/2023 10:25 AM  End Time:  7/13/2023 10:30 AM  Reason for Block: at surgeon's request and post-op pain management ONLY    patient identified, IV checked, site marked, risks and benefits discussed, surgical consent, monitors and equipment checked, pre-op evaluation and timeout performed    Patient Position:  Supine  Prep: ChloraPrep    Monitoring:  Heart rate, continuous pulse ox and cardiac monitor  Block Region:  Lower Extremity  Lower Extremity - Block Type:  Selective FEMORAL nerve block at the Adductor Canal    Laterality:  Left  Procedures: ultrasound guided  Image captured, interpreted and electronically stored.  Local Infiltration:  Lidocaine  Strength:  1 %  Dose:  3 ml  Block Type:  Single-shot  Needle Length:  100mm  Needle Gauge:  21 G  Needle Localization:  Ultrasound guidance  Injection Assessment:  Negative aspiration for heme, no paresthesia on injection, incremental injection and local visualized surrounding nerve on ultrasound  Evidence of intravascular injection: No     US Guided Selective Femoral Nerve Block at Adductor Canal:   US probe placed at mid-thigh level on externally rotated leg and femur identified.  Probe directed medially until Sartorius Muscle (SM), Femoral Artery (FA) and Saphenous Nerve (SN) identified in Adductor Canal (AC).  Needle inserted anterolateral to probe in an in plane approach into a subsartorial perivascular perineural position.  After negative aspiration LA injected with ease and visualized spreading within the AC.

## 2023-07-13 NOTE — PROGRESS NOTES
Pt arrived to floor from PACU. Assumed care of patient. Discussed daily plan of care, oriented patient to floor. VSS. Pt resting comfortably, wakes easily to voice. Pt complains of 6/10 pain to the left knee, medicated per MAR. Call light and belongings within reach. Hourly rounding in place.      1430 pt in bed, medicated per MAR    1641 pt sleeping, equal rise and fall of chest. 94% SPO2 on 2LPM O2.     1800 Pt refused to ambulate to the bathroom due to pain, medicated per MAR    1900 Pt seen sitting edge of bed, family at bedside.     1923 Report given to NOC RN.

## 2023-07-13 NOTE — ANESTHESIA PREPROCEDURE EVALUATION
Case: [id number] Anesthesia Start Date/Time: 07/13/23 1037    Procedure: ARTHROPLASTY, KNEE, TOTAL (Left)    Diagnosis:       Chronic pain of left knee [M25.562, G89.29]      Tricompartment osteoarthritis of left knee [M17.12]    Pre-op diagnosis: Chronic pain of left knee [M25.562, G89.29] Tricompartment osteoarthritis of left knee [M17.12]    Location: [place] [address] / SURGERY [facility]    Surgeons: Srinivasan Salinas M.D.        Admitted overnight after right TKA in April for oxygen desaturation in the PACU.    NPO>8hrs. No N/V.   No recent changes in exercise tolerance.    Relevant Problems   CARDIAC   (positive) HTN (hypertension)       Physical Exam    Airway   Mallampati: II  TM distance: >3 FB  Neck ROM: full       Cardiovascular - normal exam  Rhythm: regular  Rate: normal  (-) murmur     Dental - normal exam           Pulmonary - normal exam  Breath sounds clear to auscultation     Abdominal   (+) obese     Neurological - normal exam         Other findings: edentulous            Anesthesia Plan    ASA 3   ASA physical status 3 criteria: morbid obesity - BMI greater than or equal to 40    Plan - general and peripheral nerve block     Peripheral nerve block will be post-op pain control  Airway plan will be ETT          Induction: intravenous    Postoperative Plan: Postoperative administration of opioids is intended.    Pertinent diagnostic labs and testing reviewed    Informed Consent:    Anesthetic plan and risks discussed with patient.    Use of blood products discussed with: patient whom consented to blood products.

## 2023-07-14 ENCOUNTER — PATIENT OUTREACH (OUTPATIENT)
Dept: SCHEDULING | Facility: IMAGING CENTER | Age: 78
End: 2023-07-14
Payer: MEDICARE

## 2023-07-14 VITALS
RESPIRATION RATE: 18 BRPM | OXYGEN SATURATION: 93 % | WEIGHT: 216.49 LBS | BODY MASS INDEX: 39.84 KG/M2 | DIASTOLIC BLOOD PRESSURE: 72 MMHG | HEART RATE: 94 BPM | TEMPERATURE: 99.1 F | HEIGHT: 62 IN | SYSTOLIC BLOOD PRESSURE: 126 MMHG

## 2023-07-14 PROCEDURE — G0378 HOSPITAL OBSERVATION PER HR: HCPCS

## 2023-07-14 PROCEDURE — 700102 HCHG RX REV CODE 250 W/ 637 OVERRIDE(OP): Performed by: ORTHOPAEDIC SURGERY

## 2023-07-14 PROCEDURE — 97162 PT EVAL MOD COMPLEX 30 MIN: CPT

## 2023-07-14 PROCEDURE — 94760 N-INVAS EAR/PLS OXIMETRY 1: CPT

## 2023-07-14 PROCEDURE — A9270 NON-COVERED ITEM OR SERVICE: HCPCS | Performed by: ORTHOPAEDIC SURGERY

## 2023-07-14 RX ADMIN — ASPIRIN 81 MG: 81 TABLET, COATED ORAL at 05:39

## 2023-07-14 RX ADMIN — LOSARTAN POTASSIUM 50 MG: 50 TABLET, FILM COATED ORAL at 05:39

## 2023-07-14 RX ADMIN — OXYCODONE 5 MG: 5 TABLET ORAL at 10:18

## 2023-07-14 RX ADMIN — OXYCODONE 5 MG: 5 TABLET ORAL at 05:43

## 2023-07-14 RX ADMIN — ALLOPURINOL 300 MG: 300 TABLET ORAL at 05:39

## 2023-07-14 RX ADMIN — OXYCODONE HYDROCHLORIDE 10 MG: 10 TABLET ORAL at 13:31

## 2023-07-14 ASSESSMENT — COGNITIVE AND FUNCTIONAL STATUS - GENERAL
SUGGESTED CMS G CODE MODIFIER MOBILITY: CI
MOBILITY SCORE: 23
CLIMB 3 TO 5 STEPS WITH RAILING: A LITTLE

## 2023-07-14 ASSESSMENT — GAIT ASSESSMENTS
DISTANCE (FEET): 100
GAIT LEVEL OF ASSIST: STANDBY ASSIST
ASSISTIVE DEVICE: FRONT WHEEL WALKER
DEVIATION: STEP TO

## 2023-07-14 ASSESSMENT — PAIN DESCRIPTION - PAIN TYPE
TYPE: SURGICAL PAIN

## 2023-07-14 NOTE — PROGRESS NOTES
Received bedside report from night RN. Patient alert and oriented x4. Reports tolerable 3/10 left knee pain, declines intervention at this time. Discussed plan of care and understood. Assessment per GSU. Falls precaution in place. Call light placed within reach. Will continue to monitor.

## 2023-07-14 NOTE — PROGRESS NOTES
Discharge instructions, home medication and follow up appointment discussed with patient and understood. Denies any question at this time. All belongings sent with. Discharged alert and oriented x4 in stable condition. VS WNL.

## 2023-07-14 NOTE — CARE PLAN
The patient is Stable - Low risk of patient condition declining or worsening    Shift Goals  Clinical Goals: monitor pain level, void, ambulate  Patient Goals: sleep at least 8 hours  Family Goals: updated with POC    Progress made toward(s) clinical / shift goals:  Pt received Oxycodone 10 mg po given as prn dose during this shift. Pt ambulated going to the bathroom, voided freely with adequate urine output. Call light within reach. Fall precautions in placed. Hourly rounding in progress.    Patient is not progressing towards the following goals: Pt to ambulate in the hallway, she said she will do it with PT/OT today.  Educated about the importance of ambulation.

## 2023-07-14 NOTE — PROGRESS NOTES
Rounding     1906 Introduction, Patient sitting on edge of bed   2035 Polar ice set up   2108 Vitals check   2315 Patient on phone   0112 Vitals check   0312 Patient sleeping   0439 Vitals check   0613 Boosted patient up in bed   0640 Up to the bathroom

## 2023-07-14 NOTE — PROGRESS NOTES
2200H Pt awake, watching tv, O2 sat 96%    0000H Pt resting in bed,eyes closed, respirations even and unlabored    0219H Pt resting in bed,eyes closed, respirations even and unlabored, arouses easily    0336H Pt resting in bed,eyes closed, respirations even and unlabored, arouses easily    0605H Pt resting in bed,eyes closed, respirations even and unlabored, arouses easily      0710H Bedside report given

## 2023-07-14 NOTE — DIETARY
"Nutrition Services - Brief Note    Pt to list for \"yes\" responses to admit screening questions of >stage 3 pressure wound/wound dehiscence/significant burns POA, oral nutrition supplement/nutrition support at home, and difficulty chewing. RD visited pt at bedside to review screening results. Pt seen while eating breakfast; she reports no difficulty chewing and she does not currently take nutrition supplements at home. No documented staged wounds per chart: pt w/ hx of left knee advanced tricompartmental arthritis now s/p L knee TKA.    RD will screen weekly per department policy; available PRN.    "

## 2023-07-14 NOTE — CARE PLAN
The patient is Stable - Low risk of patient condition declining or worsening    Shift Goals  Clinical Goals: Pain control, ambulate, void  Patient Goals: rest comfortably  Family Goals: updated with POC    Progress made toward(s) clinical / shift goals: PRN pain medication administered per MAR, pain controlled. Pt refused to ambulate but was able to sit at the edge of bed.     Patient is not progressing towards the following goals: Pt has not voided at this time. Encouraged to increase fluid intake.

## 2023-07-20 NOTE — PROGRESS NOTES
Date of Admission: 7/13/23  Date of Discharge: 7/14/23    Preoperative diagnosis: left Knee DJD  Postoperative diagnosis: left Knee DJD    Procedure performed: left Total Knee Arthoplasty    Surgeon and Attending Physician: Dr. Srinivasan Salinas    Complications: None    Discharge Condition: The patient will be discharged home in stable condition and good pain relief.     Clinical History: Shital is a 77 year old female who presented with a chief complaint of left knee pain. The patient presented with advanced knee DJD. The patient tried and failed extensive conservative treatments and elected to undergo a Total knee arthroplasty.     Hospital Course: The patient was taken to the orthopedic unit following the procedure. The patient was in stable condition throughout the entire hospital stay. On Post op Day number 1 the patient was ambulatory safely in the farrell. The patient was cleared by physical therapy and occupational therapy prior to discharge. The patient was voiding, tolerating a regular diet, and had no complaints of nausea, vomiting, shortness of breath or chest pain. The patient was distally neurovascularly intact in bilateral lower extremities. Plantar flexion and dorsiflexion was 5/5 in strength. The incision was clean, dry and approximated, without signs of infection. Post op labs and vital signs remained stable. There were no complaints of calf pain to palpation, redness, heat or edema.     Discharge instructions:  1. To follow up with Dr. Salinas in 7-10 days  2. To monitor the incision closely, and call for symptoms of infection. Keep the incision clean, dry and covered. Ok to shower, but cover with saran wrap and tape. Change the dressing as needed.   3. Monitor for signs of DVT and call if present.   4. Use over-the-counter stool softeners or laxatives as needed for constipation.  5. Wean off narcotics as tolerated.   6. Weight Bearing as tolerated  7. To start outpatient as scheduled, or within 7 days.

## 2023-11-29 ENCOUNTER — PATIENT MESSAGE (OUTPATIENT)
Dept: HEALTH INFORMATION MANAGEMENT | Facility: OTHER | Age: 78
End: 2023-11-29

## 2025-05-15 ENCOUNTER — HOSPITAL ENCOUNTER (EMERGENCY)
Facility: MEDICAL CENTER | Age: 80
End: 2025-05-15
Attending: EMERGENCY MEDICINE
Payer: MEDICARE

## 2025-05-15 ENCOUNTER — APPOINTMENT (OUTPATIENT)
Dept: RADIOLOGY | Facility: MEDICAL CENTER | Age: 80
End: 2025-05-15
Attending: EMERGENCY MEDICINE
Payer: MEDICARE

## 2025-05-15 VITALS
TEMPERATURE: 98 F | SYSTOLIC BLOOD PRESSURE: 156 MMHG | RESPIRATION RATE: 18 BRPM | BODY MASS INDEX: 43 KG/M2 | OXYGEN SATURATION: 97 % | DIASTOLIC BLOOD PRESSURE: 98 MMHG | WEIGHT: 233.69 LBS | HEIGHT: 62 IN | HEART RATE: 78 BPM

## 2025-05-15 DIAGNOSIS — R10.9 LEFT FLANK PAIN: Primary | ICD-10-CM

## 2025-05-15 DIAGNOSIS — N39.0 ACUTE UTI: ICD-10-CM

## 2025-05-15 LAB
ALBUMIN SERPL BCP-MCNC: 4.1 G/DL (ref 3.2–4.9)
ALBUMIN/GLOB SERPL: 1.3 G/DL
ALP SERPL-CCNC: 95 U/L (ref 30–99)
ALT SERPL-CCNC: 25 U/L (ref 2–50)
ANION GAP SERPL CALC-SCNC: 14 MMOL/L (ref 7–16)
APPEARANCE UR: CLEAR
AST SERPL-CCNC: 34 U/L (ref 12–45)
BACTERIA #/AREA URNS HPF: ABNORMAL /HPF
BASOPHILS # BLD AUTO: 0.5 % (ref 0–1.8)
BASOPHILS # BLD: 0.04 K/UL (ref 0–0.12)
BILIRUB SERPL-MCNC: 0.4 MG/DL (ref 0.1–1.5)
BILIRUB UR QL STRIP.AUTO: NEGATIVE
BUN SERPL-MCNC: 14 MG/DL (ref 8–22)
CALCIUM ALBUM COR SERPL-MCNC: 9.4 MG/DL (ref 8.5–10.5)
CALCIUM SERPL-MCNC: 9.5 MG/DL (ref 8.5–10.5)
CASTS URNS QL MICRO: ABNORMAL /LPF (ref 0–2)
CHLORIDE SERPL-SCNC: 102 MMOL/L (ref 96–112)
CO2 SERPL-SCNC: 22 MMOL/L (ref 20–33)
COLOR UR: YELLOW
CREAT SERPL-MCNC: 0.67 MG/DL (ref 0.5–1.4)
EOSINOPHIL # BLD AUTO: 0.23 K/UL (ref 0–0.51)
EOSINOPHIL NFR BLD: 3.2 % (ref 0–6.9)
EPITHELIAL CELLS 1715: ABNORMAL /HPF (ref 0–5)
ERYTHROCYTE [DISTWIDTH] IN BLOOD BY AUTOMATED COUNT: 49.3 FL (ref 35.9–50)
GFR SERPLBLD CREATININE-BSD FMLA CKD-EPI: 89 ML/MIN/1.73 M 2
GLOBULIN SER CALC-MCNC: 3.1 G/DL (ref 1.9–3.5)
GLUCOSE SERPL-MCNC: 94 MG/DL (ref 65–99)
GLUCOSE UR STRIP.AUTO-MCNC: NEGATIVE MG/DL
HCT VFR BLD AUTO: 40.8 % (ref 37–47)
HGB BLD-MCNC: 13.5 G/DL (ref 12–16)
IMM GRANULOCYTES # BLD AUTO: 0.02 K/UL (ref 0–0.11)
IMM GRANULOCYTES NFR BLD AUTO: 0.3 % (ref 0–0.9)
KETONES UR STRIP.AUTO-MCNC: NEGATIVE MG/DL
LEUKOCYTE ESTERASE UR QL STRIP.AUTO: ABNORMAL
LIPASE SERPL-CCNC: 36 U/L (ref 11–82)
LYMPHOCYTES # BLD AUTO: 1.96 K/UL (ref 1–4.8)
LYMPHOCYTES NFR BLD: 26.8 % (ref 22–41)
MCH RBC QN AUTO: 32.4 PG (ref 27–33)
MCHC RBC AUTO-ENTMCNC: 33.1 G/DL (ref 32.2–35.5)
MCV RBC AUTO: 97.8 FL (ref 81.4–97.8)
MICRO URNS: ABNORMAL
MONOCYTES # BLD AUTO: 0.54 K/UL (ref 0–0.85)
MONOCYTES NFR BLD AUTO: 7.4 % (ref 0–13.4)
NEUTROPHILS # BLD AUTO: 4.51 K/UL (ref 1.82–7.42)
NEUTROPHILS NFR BLD: 61.8 % (ref 44–72)
NITRITE UR QL STRIP.AUTO: NEGATIVE
NRBC # BLD AUTO: 0 K/UL
NRBC BLD-RTO: 0 /100 WBC (ref 0–0.2)
PH UR STRIP.AUTO: 6.5 [PH] (ref 5–8)
PLATELET # BLD AUTO: 254 K/UL (ref 164–446)
PMV BLD AUTO: 9.3 FL (ref 9–12.9)
POTASSIUM SERPL-SCNC: 4.6 MMOL/L (ref 3.6–5.5)
PROT SERPL-MCNC: 7.2 G/DL (ref 6–8.2)
PROT UR QL STRIP: NEGATIVE MG/DL
RBC # BLD AUTO: 4.17 M/UL (ref 4.2–5.4)
RBC # URNS HPF: ABNORMAL /HPF
RBC UR QL AUTO: NEGATIVE
SODIUM SERPL-SCNC: 138 MMOL/L (ref 135–145)
SP GR UR STRIP.AUTO: 1.01
UROBILINOGEN UR STRIP.AUTO-MCNC: 0.2 EU/DL
WBC # BLD AUTO: 7.3 K/UL (ref 4.8–10.8)
WBC #/AREA URNS HPF: ABNORMAL /HPF

## 2025-05-15 PROCEDURE — 700117 HCHG RX CONTRAST REV CODE 255: Performed by: EMERGENCY MEDICINE

## 2025-05-15 PROCEDURE — 700111 HCHG RX REV CODE 636 W/ 250 OVERRIDE (IP): Performed by: EMERGENCY MEDICINE

## 2025-05-15 PROCEDURE — 96374 THER/PROPH/DIAG INJ IV PUSH: CPT | Mod: XU

## 2025-05-15 PROCEDURE — 74177 CT ABD & PELVIS W/CONTRAST: CPT

## 2025-05-15 PROCEDURE — 36415 COLL VENOUS BLD VENIPUNCTURE: CPT

## 2025-05-15 PROCEDURE — 81001 URINALYSIS AUTO W/SCOPE: CPT

## 2025-05-15 PROCEDURE — 99284 EMERGENCY DEPT VISIT MOD MDM: CPT

## 2025-05-15 PROCEDURE — 80053 COMPREHEN METABOLIC PANEL: CPT

## 2025-05-15 PROCEDURE — 83690 ASSAY OF LIPASE: CPT

## 2025-05-15 PROCEDURE — 85025 COMPLETE CBC W/AUTO DIFF WBC: CPT

## 2025-05-15 RX ORDER — CEPHALEXIN 500 MG/1
500 CAPSULE ORAL 3 TIMES DAILY
Qty: 21 CAPSULE | Refills: 0 | Status: ACTIVE | OUTPATIENT
Start: 2025-05-15 | End: 2025-05-22

## 2025-05-15 RX ORDER — CEFAZOLIN 2 G/1
2 INJECTION, POWDER, FOR SOLUTION INTRAMUSCULAR; INTRAVENOUS ONCE
Status: COMPLETED | OUTPATIENT
Start: 2025-05-15 | End: 2025-05-15

## 2025-05-15 RX ADMIN — CEFAZOLIN 2 G: 2 INJECTION, POWDER, FOR SOLUTION INTRAMUSCULAR; INTRAVENOUS at 12:42

## 2025-05-15 RX ADMIN — IOHEXOL 100 ML: 350 INJECTION, SOLUTION INTRAVENOUS at 12:15

## 2025-05-15 NOTE — ED TRIAGE NOTES
"BIB family for following complaints.     Chief Complaint   Patient presents with    Flank Pain     Left sided flank pain since yesterday. Denies dysuria or blood in urine, nausea or vomiting. Gall bladder removed.      BP (!) 178/106   Pulse 86   Temp 36.7 °C (98 °F) (Temporal)   Resp 14   Ht 1.575 m (5' 2\")   Wt 106 kg (233 lb 11 oz)   SpO2 97%   BMI 42.74 kg/m²     "

## 2025-05-15 NOTE — ED PROVIDER NOTES
ED Provider Note    CHIEF COMPLAINT  Chief Complaint   Patient presents with    Flank Pain     Left sided flank pain since yesterday. Denies dysuria or blood in urine, nausea or vomiting. Gall bladder removed.        EXTERNAL RECORDS REVIEWED  Reviewed medication list baseline laboratory studies    HPI/ROS  LIMITATION TO HISTORY   None  OUTSIDE HISTORIAN(S):  Adult daughter provided additional history    Shital Hemphill is a 79 y.o. female who presents for evaluation of left flank discomfort.  She reports some nonbloody diarrhea as well.  She also has some chronic left hip pain.  She denies fevers or chills no hematemesis hematochezia or melena.  No dysuria or hematuria.  No known history of kidney stones.  No report of high fever or productive cough.  No associated chest pain.  No recent antibiotic use.  Pain is worse with movement mostly in the left lower quadrant and left flank.    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    SURGICAL HISTORY   has a past surgical history that includes cholecystectomy; abdominal hysterectomy total; total knee arthroplasty (Right, 04/06/2023); cataract extraction with iol (Bilateral); and total knee arthroplasty (Left, 7/13/2023).    FAMILY HISTORY  Family History   Problem Relation Age of Onset    Hypertension Mother        SOCIAL HISTORY  Social History     Tobacco Use    Smoking status: Never    Smokeless tobacco: Never   Vaping Use    Vaping status: Never Used   Substance and Sexual Activity    Alcohol use: Never    Drug use: Never    Sexual activity: Not Currently       CURRENT MEDICATIONS  Home Medications       Reviewed by Elvin Ashton R.N. (Registered Nurse) on 05/15/25 at 0916  Med List Status: Not Addressed     Medication Last Dose Status   acetaminophen (TYLENOL) 325 MG Tab  Active   allopurinol (ZYLOPRIM)  "300 MG Tab  Active   ASPIRIN LOW DOSE 81 MG EC tablet  Active   cetirizine (ZYRTEC) 10 MG Tab  Active   dexamethasone (DECADRON) 4 MG/ML Solution  Active   fentaNYL Citrate, PF, 100 MCG/2ML Solution  Active   Glucosamine HCl (GLUCOSAMINE PO)  Active   losartan (COZAAR) 50 MG Tab  Active   meloxicam (MOBIC) 15 MG tablet  Active   meloxicam (MOBIC) 7.5 MG Tab  Active   Multiple Vitamins-Minerals (CENTRUM PO)  Active   oxyCODONE immediate release (ROXICODONE) 10 MG immediate release tablet  Active   oxyCODONE immediate-release (ROXICODONE) 5 MG Tab  Active                  Audit from Redirected Encounters    **Home medications have not yet been reviewed for this encounter**         ALLERGIES  Allergies[1]    PHYSICAL EXAM  VITAL SIGNS: BP (!) 178/106   Pulse 86   Temp 36.7 °C (98 °F) (Temporal)   Resp 14   Ht 1.575 m (5' 2\")   Wt 106 kg (233 lb 11 oz)   SpO2 97%   BMI 42.74 kg/m²    Pulse ox interpretation: I interpret this pulse ox as normal.  Constitutional: Alert and oriented x 3, no acute distress  HEENT: Atraumatic normocephalic, pupils are equal round reactive to light extraocular movements are intact. The nares is clear, external ears are normal, mouth shows moist mucous membranes normal dentition for age  Neck: Supple, no JVD no tracheal deviation  Cardiovascular: Regular rate and rhythm no murmur rub or gallop 2+ pulses peripherally x4  Thorax & Lungs: No respiratory distress, no wheezes rales or rhonchi, No chest tenderness.   GI: Soft nontender nondistended positive bowel sounds, no peritoneal signs mild reproducible left flank pain mild left lower quadrant discomfort no peritoneal signs  Skin: Warm dry no acute rash or lesion  Musculoskeletal: Moving all extremities with full range and 5 of 5 strength no acute  deformity  Neurologic: Cranial nerves III through XII are grossly intact no sensory deficit no cerebellar dysfunction   Psychiatric: Appropriate affect for situation at this " time          EKG/LABS  Results for orders placed or performed during the hospital encounter of 05/15/25   Urinalysis, Culture if Indicated    Collection Time: 05/15/25  9:17 AM    Specimen: Urine, Clean Catch   Result Value Ref Range    Color Yellow     Character Clear     Specific Gravity 1.012 <1.035    Ph 6.5 5.0 - 8.0    Glucose Negative Negative mg/dL    Ketones Negative Negative mg/dL    Protein Negative Negative mg/dL    Bilirubin Negative Negative    Urobilinogen, Urine 0.2 <=1.0 EU/dL    Nitrite Negative Negative    Leukocyte Esterase Moderate (A) Negative    Occult Blood Negative Negative    Micro Urine Req Microscopic    URINE MICROSCOPIC (W/UA)    Collection Time: 05/15/25  9:17 AM   Result Value Ref Range    WBC 6-10 (A) /hpf    RBC 0-2 /hpf    Bacteria None Seen None /hpf    Epithelial Cells 3-5 0 - 5 /hpf    Urine Casts 0-2 0 - 2 /lpf   CBC WITH DIFFERENTIAL    Collection Time: 05/15/25 10:20 AM   Result Value Ref Range    WBC 7.3 4.8 - 10.8 K/uL    RBC 4.17 (L) 4.20 - 5.40 M/uL    Hemoglobin 13.5 12.0 - 16.0 g/dL    Hematocrit 40.8 37.0 - 47.0 %    MCV 97.8 81.4 - 97.8 fL    MCH 32.4 27.0 - 33.0 pg    MCHC 33.1 32.2 - 35.5 g/dL    RDW 49.3 35.9 - 50.0 fL    Platelet Count 254 164 - 446 K/uL    MPV 9.3 9.0 - 12.9 fL    Neutrophils-Polys 61.80 44.00 - 72.00 %    Lymphocytes 26.80 22.00 - 41.00 %    Monocytes 7.40 0.00 - 13.40 %    Eosinophils 3.20 0.00 - 6.90 %    Basophils 0.50 0.00 - 1.80 %    Immature Granulocytes 0.30 0.00 - 0.90 %    Nucleated RBC 0.00 0.00 - 0.20 /100 WBC    Neutrophils (Absolute) 4.51 1.82 - 7.42 K/uL    Lymphs (Absolute) 1.96 1.00 - 4.80 K/uL    Monos (Absolute) 0.54 0.00 - 0.85 K/uL    Eos (Absolute) 0.23 0.00 - 0.51 K/uL    Baso (Absolute) 0.04 0.00 - 0.12 K/uL    Immature Granulocytes (abs) 0.02 0.00 - 0.11 K/uL    NRBC (Absolute) 0.00 K/uL   Comp Metabolic Panel    Collection Time: 05/15/25 10:20 AM   Result Value Ref Range    Sodium 138 135 - 145 mmol/L    Potassium 4.6  3.6 - 5.5 mmol/L    Chloride 102 96 - 112 mmol/L    Co2 22 20 - 33 mmol/L    Anion Gap 14.0 7.0 - 16.0    Glucose 94 65 - 99 mg/dL    Bun 14 8 - 22 mg/dL    Creatinine 0.67 0.50 - 1.40 mg/dL    Calcium 9.5 8.5 - 10.5 mg/dL    Correct Calcium 9.4 8.5 - 10.5 mg/dL    AST(SGOT) 34 12 - 45 U/L    ALT(SGPT) 25 2 - 50 U/L    Alkaline Phosphatase 95 30 - 99 U/L    Total Bilirubin 0.4 0.1 - 1.5 mg/dL    Albumin 4.1 3.2 - 4.9 g/dL    Total Protein 7.2 6.0 - 8.2 g/dL    Globulin 3.1 1.9 - 3.5 g/dL    A-G Ratio 1.3 g/dL   LIPASE    Collection Time: 05/15/25 10:20 AM   Result Value Ref Range    Lipase 36 11 - 82 U/L   ESTIMATED GFR    Collection Time: 05/15/25 10:20 AM   Result Value Ref Range    GFR (CKD-EPI) 89 >60 mL/min/1.73 m 2      I have independently interpreted this EKG    RADIOLOGY/PROCEDURES   I have independently interpreted the diagnostic imaging associated with this visit and am waiting the final reading from the radiologist.   My preliminary interpretation is as follows: No acute kidney stone no perinephric stranding or evidence of diverticulitis    Radiologist interpretation:  CT-ABDOMEN-PELVIS WITH   Final Result      1.  No evidence of bowel obstruction or focal inflammatory change.      2.  Sigmoid and descending colonic diverticulosis.      3.  Fatty liver.      4.  Small hiatal hernia.             COURSE & MEDICAL DECISION MAKING    ASSESSMENT, COURSE AND PLAN  Care Narrative:     This is a very pleasant 79-year-old accompanied by her daughter who presents with left flank pain left lower quadrant pain.  Differential diagnosis was extensive including kidney stone with or without infection pyelonephritis UTI diverticulitis with without perforation or abscess bowel obstruction splenomegaly not exclusively.  An IV was established.  Here her vital signs are reassuring and that she has no high fever hypotension tachycardia or hypoxia.Her workup here is reassuring and that she has no leukocytosis bandemia or  anemia.  Metabolic panel demonstrates preserved kidney function without elevation of LFTs or lipase.  Her urinalysis does have some white cells.  CT scan was performed to rule out acute surgical pathology and fortunately does not demonstrate any acute findings.  I suspect she could have mild UTI.  I have low suspicion for fulminant pyelonephritis given the lack of fever or leukocytosis or significant findings on urinalysis but I do feel that it merits treatment.  First dose Ancef was administered here.  We will continue her on oral Keflex.  Return precautions have been reviewed          ADDITIONAL PROBLEMS MANAGED      DISPOSITION AND DISCUSSIONS  I have discussed management of the patient with the following physicians and MONTSERRAT's: None    Discussion of management with other QHP or appropriate source(s): None    Escalation of care considered, and ultimately not performed: Considered admission    Barriers to care at this time, including but not limited to: None.     Decision tools and prescription drugs considered including, but not limited to: Patient will be prescribed Keflex.    FINAL DIAGNOSIS  1. Left flank pain        2. Acute UTI  cephALEXin (KEFLEX) 500 MG Cap             Electronically signed by: Osmel Min M.D., 5/15/2025 10:08 AM           [1] No Known Allergies

## 2025-06-19 ENCOUNTER — HOSPITAL ENCOUNTER (OUTPATIENT)
Facility: MEDICAL CENTER | Age: 80
End: 2025-06-20
Attending: STUDENT IN AN ORGANIZED HEALTH CARE EDUCATION/TRAINING PROGRAM | Admitting: STUDENT IN AN ORGANIZED HEALTH CARE EDUCATION/TRAINING PROGRAM
Payer: MEDICARE

## 2025-06-19 ENCOUNTER — APPOINTMENT (OUTPATIENT)
Dept: RADIOLOGY | Facility: MEDICAL CENTER | Age: 80
End: 2025-06-19
Payer: MEDICARE

## 2025-06-19 ENCOUNTER — APPOINTMENT (OUTPATIENT)
Dept: RADIOLOGY | Facility: MEDICAL CENTER | Age: 80
End: 2025-06-19
Attending: STUDENT IN AN ORGANIZED HEALTH CARE EDUCATION/TRAINING PROGRAM
Payer: MEDICARE

## 2025-06-19 DIAGNOSIS — R94.31 ABNORMAL EKG: ICD-10-CM

## 2025-06-19 DIAGNOSIS — I30.9 ACUTE PERICARDITIS, UNSPECIFIED TYPE: ICD-10-CM

## 2025-06-19 DIAGNOSIS — R07.9 CHEST PAIN, UNSPECIFIED TYPE: Primary | ICD-10-CM

## 2025-06-19 DIAGNOSIS — Z91.89 MULTIPLE RISK FACTORS FOR CORONARY ARTERY DISEASE: ICD-10-CM

## 2025-06-19 PROBLEM — I95.9 HYPOTENSION: Status: ACTIVE | Noted: 2025-06-19

## 2025-06-19 LAB
ALBUMIN SERPL BCP-MCNC: 4.1 G/DL (ref 3.2–4.9)
ALBUMIN/GLOB SERPL: 1.1 G/DL
ALP SERPL-CCNC: 118 U/L (ref 30–99)
ALT SERPL-CCNC: 20 U/L (ref 2–50)
AMPHET UR QL SCN: NEGATIVE
ANION GAP SERPL CALC-SCNC: 14 MMOL/L (ref 7–16)
AST SERPL-CCNC: 33 U/L (ref 12–45)
BARBITURATES UR QL SCN: NEGATIVE
BASOPHILS # BLD AUTO: 0.4 % (ref 0–1.8)
BASOPHILS # BLD: 0.04 K/UL (ref 0–0.12)
BENZODIAZ UR QL SCN: NEGATIVE
BILIRUB SERPL-MCNC: 0.4 MG/DL (ref 0.1–1.5)
BUN SERPL-MCNC: 18 MG/DL (ref 8–22)
BZE UR QL SCN: NEGATIVE
CALCIUM ALBUM COR SERPL-MCNC: 9.3 MG/DL (ref 8.5–10.5)
CALCIUM SERPL-MCNC: 9.4 MG/DL (ref 8.5–10.5)
CANNABINOIDS UR QL SCN: NEGATIVE
CHLORIDE SERPL-SCNC: 102 MMOL/L (ref 96–112)
CHOLEST SERPL-MCNC: 171 MG/DL (ref 100–199)
CO2 SERPL-SCNC: 22 MMOL/L (ref 20–33)
CREAT SERPL-MCNC: 0.71 MG/DL (ref 0.5–1.4)
EKG IMPRESSION: NORMAL
EOSINOPHIL # BLD AUTO: 0.22 K/UL (ref 0–0.51)
EOSINOPHIL NFR BLD: 2 % (ref 0–6.9)
ERYTHROCYTE [DISTWIDTH] IN BLOOD BY AUTOMATED COUNT: 48.1 FL (ref 35.9–50)
EST. AVERAGE GLUCOSE BLD GHB EST-MCNC: 131 MG/DL
ETHANOL BLD-MCNC: <10.1 MG/DL
FENTANYL UR QL: NEGATIVE
GFR SERPLBLD CREATININE-BSD FMLA CKD-EPI: 86 ML/MIN/1.73 M 2
GLOBULIN SER CALC-MCNC: 3.7 G/DL (ref 1.9–3.5)
GLUCOSE SERPL-MCNC: 132 MG/DL (ref 65–99)
HBA1C MFR BLD: 6.2 % (ref 4–5.6)
HCT VFR BLD AUTO: 42.5 % (ref 37–47)
HDLC SERPL-MCNC: 74 MG/DL
HGB BLD-MCNC: 13.9 G/DL (ref 12–16)
IMM GRANULOCYTES # BLD AUTO: 0.05 K/UL (ref 0–0.11)
IMM GRANULOCYTES NFR BLD AUTO: 0.5 % (ref 0–0.9)
LDLC SERPL CALC-MCNC: 79 MG/DL
LYMPHOCYTES # BLD AUTO: 1.78 K/UL (ref 1–4.8)
LYMPHOCYTES NFR BLD: 16.5 % (ref 22–41)
MCH RBC QN AUTO: 31.6 PG (ref 27–33)
MCHC RBC AUTO-ENTMCNC: 32.7 G/DL (ref 32.2–35.5)
MCV RBC AUTO: 96.6 FL (ref 81.4–97.8)
METHADONE UR QL SCN: NEGATIVE
MONOCYTES # BLD AUTO: 0.82 K/UL (ref 0–0.85)
MONOCYTES NFR BLD AUTO: 7.6 % (ref 0–13.4)
NEUTROPHILS # BLD AUTO: 7.88 K/UL (ref 1.82–7.42)
NEUTROPHILS NFR BLD: 73 % (ref 44–72)
NRBC # BLD AUTO: 0 K/UL
NRBC BLD-RTO: 0 /100 WBC (ref 0–0.2)
NT-PROBNP SERPL IA-MCNC: 53 PG/ML (ref 0–125)
OPIATES UR QL SCN: NEGATIVE
OXYCODONE UR QL SCN: NEGATIVE
PCP UR QL SCN: NEGATIVE
PLATELET # BLD AUTO: 291 K/UL (ref 164–446)
PMV BLD AUTO: 8.3 FL (ref 9–12.9)
POTASSIUM SERPL-SCNC: 4.1 MMOL/L (ref 3.6–5.5)
PROPOXYPH UR QL SCN: NEGATIVE
PROT SERPL-MCNC: 7.8 G/DL (ref 6–8.2)
RBC # BLD AUTO: 4.4 M/UL (ref 4.2–5.4)
SODIUM SERPL-SCNC: 138 MMOL/L (ref 135–145)
TRIGL SERPL-MCNC: 92 MG/DL (ref 0–149)
TROPONIN T SERPL-MCNC: 11 NG/L (ref 6–19)
TROPONIN T SERPL-MCNC: 13 NG/L (ref 6–19)
TSH SERPL DL<=0.005 MIU/L-ACNC: 1.51 UIU/ML (ref 0.38–5.33)
WBC # BLD AUTO: 10.8 K/UL (ref 4.8–10.8)

## 2025-06-19 PROCEDURE — 99285 EMERGENCY DEPT VISIT HI MDM: CPT

## 2025-06-19 PROCEDURE — 99223 1ST HOSP IP/OBS HIGH 75: CPT | Performed by: STUDENT IN AN ORGANIZED HEALTH CARE EDUCATION/TRAINING PROGRAM

## 2025-06-19 PROCEDURE — 80061 LIPID PANEL: CPT

## 2025-06-19 PROCEDURE — 85025 COMPLETE CBC W/AUTO DIFF WBC: CPT

## 2025-06-19 PROCEDURE — A9270 NON-COVERED ITEM OR SERVICE: HCPCS | Mod: UD | Performed by: STUDENT IN AN ORGANIZED HEALTH CARE EDUCATION/TRAINING PROGRAM

## 2025-06-19 PROCEDURE — 96372 THER/PROPH/DIAG INJ SC/IM: CPT

## 2025-06-19 PROCEDURE — 700105 HCHG RX REV CODE 258: Mod: UD | Performed by: STUDENT IN AN ORGANIZED HEALTH CARE EDUCATION/TRAINING PROGRAM

## 2025-06-19 PROCEDURE — 93005 ELECTROCARDIOGRAM TRACING: CPT | Mod: TC

## 2025-06-19 PROCEDURE — 82077 ASSAY SPEC XCP UR&BREATH IA: CPT

## 2025-06-19 PROCEDURE — 84443 ASSAY THYROID STIM HORMONE: CPT

## 2025-06-19 PROCEDURE — 84484 ASSAY OF TROPONIN QUANT: CPT | Mod: 91

## 2025-06-19 PROCEDURE — 93005 ELECTROCARDIOGRAM TRACING: CPT | Mod: TC | Performed by: STUDENT IN AN ORGANIZED HEALTH CARE EDUCATION/TRAINING PROGRAM

## 2025-06-19 PROCEDURE — A9270 NON-COVERED ITEM OR SERVICE: HCPCS | Mod: UD

## 2025-06-19 PROCEDURE — 83880 ASSAY OF NATRIURETIC PEPTIDE: CPT

## 2025-06-19 PROCEDURE — 80307 DRUG TEST PRSMV CHEM ANLYZR: CPT

## 2025-06-19 PROCEDURE — 700102 HCHG RX REV CODE 250 W/ 637 OVERRIDE(OP): Mod: UD

## 2025-06-19 PROCEDURE — 700102 HCHG RX REV CODE 250 W/ 637 OVERRIDE(OP): Mod: UD | Performed by: STUDENT IN AN ORGANIZED HEALTH CARE EDUCATION/TRAINING PROGRAM

## 2025-06-19 PROCEDURE — 71045 X-RAY EXAM CHEST 1 VIEW: CPT

## 2025-06-19 PROCEDURE — 700111 HCHG RX REV CODE 636 W/ 250 OVERRIDE (IP): Mod: JZ,UD | Performed by: STUDENT IN AN ORGANIZED HEALTH CARE EDUCATION/TRAINING PROGRAM

## 2025-06-19 PROCEDURE — G0378 HOSPITAL OBSERVATION PER HR: HCPCS

## 2025-06-19 PROCEDURE — 99204 OFFICE O/P NEW MOD 45 MIN: CPT | Performed by: INTERNAL MEDICINE

## 2025-06-19 PROCEDURE — 36415 COLL VENOUS BLD VENIPUNCTURE: CPT

## 2025-06-19 PROCEDURE — 80053 COMPREHEN METABOLIC PANEL: CPT

## 2025-06-19 PROCEDURE — 83036 HEMOGLOBIN GLYCOSYLATED A1C: CPT

## 2025-06-19 RX ORDER — ASPIRIN 300 MG/1
300 SUPPOSITORY RECTAL ONCE
Status: DISCONTINUED | OUTPATIENT
Start: 2025-06-19 | End: 2025-06-19

## 2025-06-19 RX ORDER — ONDANSETRON 4 MG/1
4 TABLET, ORALLY DISINTEGRATING ORAL EVERY 4 HOURS PRN
Status: DISCONTINUED | OUTPATIENT
Start: 2025-06-19 | End: 2025-06-20 | Stop reason: HOSPADM

## 2025-06-19 RX ORDER — ALLOPURINOL 100 MG/1
300 TABLET ORAL DAILY
Status: DISCONTINUED | OUTPATIENT
Start: 2025-06-19 | End: 2025-06-20 | Stop reason: HOSPADM

## 2025-06-19 RX ORDER — SODIUM CHLORIDE, SODIUM LACTATE, POTASSIUM CHLORIDE, CALCIUM CHLORIDE 600; 310; 30; 20 MG/100ML; MG/100ML; MG/100ML; MG/100ML
1000 INJECTION, SOLUTION INTRAVENOUS ONCE
Status: COMPLETED | OUTPATIENT
Start: 2025-06-19 | End: 2025-06-19

## 2025-06-19 RX ORDER — AMOXICILLIN 250 MG
2 CAPSULE ORAL EVERY EVENING
Status: DISCONTINUED | OUTPATIENT
Start: 2025-06-19 | End: 2025-06-20 | Stop reason: HOSPADM

## 2025-06-19 RX ORDER — CEPHALEXIN 500 MG/1
500 CAPSULE ORAL 2 TIMES DAILY
Status: ON HOLD | COMMUNITY
End: 2025-06-20

## 2025-06-19 RX ORDER — COLCHICINE 0.6 MG/1
0.6 TABLET ORAL 2 TIMES DAILY
Status: DISCONTINUED | OUTPATIENT
Start: 2025-06-19 | End: 2025-06-20 | Stop reason: HOSPADM

## 2025-06-19 RX ORDER — LOSARTAN POTASSIUM 50 MG/1
100 TABLET ORAL DAILY
Status: DISCONTINUED | OUTPATIENT
Start: 2025-06-19 | End: 2025-06-20 | Stop reason: HOSPADM

## 2025-06-19 RX ORDER — ONDANSETRON 2 MG/ML
4 INJECTION INTRAMUSCULAR; INTRAVENOUS EVERY 4 HOURS PRN
Status: DISCONTINUED | OUTPATIENT
Start: 2025-06-19 | End: 2025-06-20 | Stop reason: HOSPADM

## 2025-06-19 RX ORDER — POLYETHYLENE GLYCOL 3350 17 G/17G
1 POWDER, FOR SOLUTION ORAL
Status: DISCONTINUED | OUTPATIENT
Start: 2025-06-19 | End: 2025-06-20 | Stop reason: HOSPADM

## 2025-06-19 RX ORDER — CETIRIZINE HYDROCHLORIDE 10 MG/1
10 TABLET ORAL DAILY
Status: DISCONTINUED | OUTPATIENT
Start: 2025-06-19 | End: 2025-06-19

## 2025-06-19 RX ORDER — ACETAMINOPHEN 325 MG/1
650 TABLET ORAL EVERY 6 HOURS PRN
Status: DISCONTINUED | OUTPATIENT
Start: 2025-06-19 | End: 2025-06-20 | Stop reason: HOSPADM

## 2025-06-19 RX ORDER — IBUPROFEN 600 MG/1
600 TABLET, FILM COATED ORAL EVERY 8 HOURS
Status: DISCONTINUED | OUTPATIENT
Start: 2025-06-19 | End: 2025-06-20 | Stop reason: HOSPADM

## 2025-06-19 RX ORDER — AMLODIPINE BESYLATE 10 MG/1
10 TABLET ORAL
COMMUNITY
Start: 2025-06-10

## 2025-06-19 RX ORDER — ASPIRIN 81 MG/1
81 TABLET ORAL DAILY
Status: DISCONTINUED | OUTPATIENT
Start: 2025-06-20 | End: 2025-06-20 | Stop reason: HOSPADM

## 2025-06-19 RX ORDER — ASPIRIN 81 MG/1
324 TABLET, CHEWABLE ORAL ONCE
Status: DISCONTINUED | OUTPATIENT
Start: 2025-06-19 | End: 2025-06-19

## 2025-06-19 RX ORDER — ASPIRIN 325 MG
325 TABLET ORAL ONCE
Status: DISCONTINUED | OUTPATIENT
Start: 2025-06-19 | End: 2025-06-19

## 2025-06-19 RX ORDER — NITROGLYCERIN 0.4 MG/1
0.4 TABLET SUBLINGUAL
Status: DISCONTINUED | OUTPATIENT
Start: 2025-06-19 | End: 2025-06-19

## 2025-06-19 RX ORDER — LOSARTAN POTASSIUM 50 MG/1
50 TABLET ORAL DAILY
Status: DISCONTINUED | OUTPATIENT
Start: 2025-06-19 | End: 2025-06-19

## 2025-06-19 RX ORDER — ATORVASTATIN CALCIUM 80 MG/1
80 TABLET, FILM COATED ORAL EVERY EVENING
Status: DISCONTINUED | OUTPATIENT
Start: 2025-06-19 | End: 2025-06-20 | Stop reason: HOSPADM

## 2025-06-19 RX ORDER — AMLODIPINE BESYLATE 10 MG/1
10 TABLET ORAL
Status: DISCONTINUED | OUTPATIENT
Start: 2025-06-19 | End: 2025-06-20 | Stop reason: HOSPADM

## 2025-06-19 RX ORDER — ENOXAPARIN SODIUM 100 MG/ML
40 INJECTION SUBCUTANEOUS DAILY
Status: DISCONTINUED | OUTPATIENT
Start: 2025-06-19 | End: 2025-06-20 | Stop reason: HOSPADM

## 2025-06-19 RX ADMIN — IBUPROFEN 600 MG: 600 TABLET ORAL at 14:43

## 2025-06-19 RX ADMIN — ENOXAPARIN SODIUM 40 MG: 100 INJECTION SUBCUTANEOUS at 17:47

## 2025-06-19 RX ADMIN — IBUPROFEN 600 MG: 600 TABLET ORAL at 08:05

## 2025-06-19 RX ADMIN — ALLOPURINOL 300 MG: 100 TABLET ORAL at 12:34

## 2025-06-19 RX ADMIN — COLCHICINE 0.6 MG: 0.6 TABLET, FILM COATED ORAL at 08:05

## 2025-06-19 RX ADMIN — IBUPROFEN 600 MG: 600 TABLET ORAL at 21:29

## 2025-06-19 RX ADMIN — SODIUM CHLORIDE, POTASSIUM CHLORIDE, SODIUM LACTATE AND CALCIUM CHLORIDE 1000 ML: 600; 310; 30; 20 INJECTION, SOLUTION INTRAVENOUS at 03:56

## 2025-06-19 RX ADMIN — ATORVASTATIN CALCIUM 80 MG: 80 TABLET, FILM COATED ORAL at 17:47

## 2025-06-19 RX ADMIN — COLCHICINE 0.6 MG: 0.6 TABLET, FILM COATED ORAL at 17:47

## 2025-06-19 RX ADMIN — NITROGLYCERIN 0.4 MG: 0.4 TABLET SUBLINGUAL at 03:37

## 2025-06-19 SDOH — ECONOMIC STABILITY: TRANSPORTATION INSECURITY
IN THE PAST 12 MONTHS, HAS THE LACK OF TRANSPORTATION KEPT YOU FROM MEDICAL APPOINTMENTS OR FROM GETTING MEDICATIONS?: NO

## 2025-06-19 SDOH — ECONOMIC STABILITY: TRANSPORTATION INSECURITY
IN THE PAST 12 MONTHS, HAS LACK OF RELIABLE TRANSPORTATION KEPT YOU FROM MEDICAL APPOINTMENTS, MEETINGS, WORK OR FROM GETTING THINGS NEEDED FOR DAILY LIVING?: NO

## 2025-06-19 ASSESSMENT — ENCOUNTER SYMPTOMS
HEADACHES: 0
CONSTITUTIONAL NEGATIVE: 1
EYE PAIN: 0
NERVOUS/ANXIOUS: 0
DIARRHEA: 0
WEAKNESS: 0
CONSTIPATION: 0
WHEEZING: 0
NEUROLOGICAL NEGATIVE: 1
CHILLS: 0
PSYCHIATRIC NEGATIVE: 1
DIZZINESS: 0
CARDIOVASCULAR NEGATIVE: 1
NECK PAIN: 0
COUGH: 0
SHORTNESS OF BREATH: 0
MYALGIAS: 0
VOMITING: 0
MUSCULOSKELETAL NEGATIVE: 1
RESPIRATORY NEGATIVE: 1
BACK PAIN: 0
EYES NEGATIVE: 1
NAUSEA: 0
PALPITATIONS: 0
BRUISES/BLEEDS EASILY: 0
GASTROINTESTINAL NEGATIVE: 1
ABDOMINAL PAIN: 0
BLOOD IN STOOL: 0
FEVER: 0
DOUBLE VISION: 0
BLURRED VISION: 0

## 2025-06-19 ASSESSMENT — LIFESTYLE VARIABLES
ON A TYPICAL DAY WHEN YOU DRINK ALCOHOL HOW MANY DRINKS DO YOU HAVE: 0
HAVE YOU EVER FELT YOU SHOULD CUT DOWN ON YOUR DRINKING: NO
HAVE PEOPLE ANNOYED YOU BY CRITICIZING YOUR DRINKING: NO
TOTAL SCORE: 0
CONSUMPTION TOTAL: NEGATIVE
EVER HAD A DRINK FIRST THING IN THE MORNING TO STEADY YOUR NERVES TO GET RID OF A HANGOVER: NO
ALCOHOL_USE: NO
TOTAL SCORE: 0
EVER FELT BAD OR GUILTY ABOUT YOUR DRINKING: NO
DOES PATIENT WANT TO STOP DRINKING: NO
AVERAGE NUMBER OF DAYS PER WEEK YOU HAVE A DRINK CONTAINING ALCOHOL: 0
HOW MANY TIMES IN THE PAST YEAR HAVE YOU HAD 5 OR MORE DRINKS IN A DAY: 0
TOTAL SCORE: 0

## 2025-06-19 ASSESSMENT — SOCIAL DETERMINANTS OF HEALTH (SDOH)

## 2025-06-19 ASSESSMENT — FIBROSIS 4 INDEX
FIB4 SCORE: 2
FIB4 SCORE: 2.11

## 2025-06-19 ASSESSMENT — COGNITIVE AND FUNCTIONAL STATUS - GENERAL
WALKING IN HOSPITAL ROOM: A LITTLE
CLIMB 3 TO 5 STEPS WITH RAILING: A LOT
SUGGESTED CMS G CODE MODIFIER DAILY ACTIVITY: CH
DAILY ACTIVITIY SCORE: 24
MOBILITY SCORE: 21
SUGGESTED CMS G CODE MODIFIER MOBILITY: CJ

## 2025-06-19 ASSESSMENT — PAIN DESCRIPTION - PAIN TYPE
TYPE: ACUTE PAIN
TYPE: ACUTE PAIN

## 2025-06-19 NOTE — ASSESSMENT & PLAN NOTE
Patient with substernal chest pain since 0100 this morning, waking them up from sleep.  Troponin 11>> 13, NT proBNP 53.  Chest x-ray with left basilar atelectasis.  EKG NSR HR 87, QTc 456, borderline ST elevation in inferior leads.  EKG NSR HR 87, QTc 449, with T wave flattening lead III and aVF.  EKG NSR HR 83, QTc 454, ST elevation in lead II, T wave flattening lead III and aVF.    HEART 7, BENNETT 2, MELONY 130 scores    -Cardiology consulted   -Suspect pericarditis, recommending echo and stress test.  -Telemetry  -ASA 325mg PO x1 with ASA 81mg PO daily thereafter  -Atorvastatin 80mg daily  -Order HbA1c, lipid panel, and TSH

## 2025-06-19 NOTE — H&P
Hospital Medicine History & Physical Note    Date of Service  6/19/2025    Primary Care Physician  Eyal Pastor M.D.    Consultants  none    Specialist Names: N/A    Code Status  Full Code    Chief Complaint  Chief Complaint   Patient presents with    Chest Pain     Patient BIBA from home for chest pain x 1 hour. Patient states HTN as only medical hx. Patient visibly uncomfortable in triage. States she was asleep when the pain started.        History of Presenting Illness  Patient is a 79-year-old female with past medical history of HTN, gout that presents with chest pain since 0100.    Patient states that they were woken up from sleep due to substernal chest pressure starting at 0100.  Patient states that it does not radiate anywhere.  States that they are still having this chest pain at this time.  States that they were recently started on additional antihypertensive last week per their PCP.    Received  mg p.o. x 1 en router per EMS.    In the ED, BP 70s to 140s/40s to 80s, HR 70s to 90s, RR 15-20, afebrile, saturating well on room air.  Received LR 1 L bolus x 1, nitroglycerin 0.4 mg SL x 1.  CBC unremarkable.  Potassium 4.1, bicarb 22, anion gap 14, BUN 18, creatinine 0.71, alk phos 118, troponin 11>> 13, NT proBNP 53.  Chest x-ray with left basilar atelectasis.  EKG NSR HR 87, QTc 456, borderline ST elevation in inferior leads.  EKG NSR HR 87, QTc 449, with T wave flattening lead III and aVF.  EKG NSR HR 83, QTc 454, ST elevation in lead II, T wave flattening lead III and aVF.    I discussed the plan of care with patient.    Review of Systems  Review of Systems   Constitutional:  Negative for chills and fever.   HENT:  Negative for ear pain.    Eyes:  Negative for blurred vision, double vision and pain.   Respiratory:  Negative for cough, shortness of breath and wheezing.    Cardiovascular:  Positive for chest pain. Negative for palpitations and leg swelling.   Gastrointestinal:  Negative for  abdominal pain, blood in stool, constipation, diarrhea, melena, nausea and vomiting.   Genitourinary:  Negative for dysuria, frequency and hematuria.   Musculoskeletal:  Negative for back pain, joint pain and neck pain.   Neurological:  Negative for dizziness and headaches.       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    Surgical History   has a past surgical history that includes cholecystectomy; abdominal hysterectomy total; pr total knee arthroplasty (Right, 04/06/2023); cataract extraction with iol (Bilateral); and pr total knee arthroplasty (Left, 7/13/2023).     Family History  Family History   Problem Relation Age of Onset    Hypertension Mother         Family history reviewed with patient. There is no family history that is pertinent to the chief complaint.     Social History   reports that she has never smoked. She has never used smokeless tobacco. She reports that she does not drink alcohol and does not use drugs.    Allergies  Allergies[1]    Medications  Prior to Admission Medications   Prescriptions Last Dose Informant Patient Reported? Taking?   ASPIRIN LOW DOSE 81 MG EC tablet   Yes No   Patient not taking: Reported on 6/26/2023   Glucosamine HCl (GLUCOSAMINE PO)   Yes No   Sig: Take 1 Tablet by mouth 2 times a day.   Multiple Vitamins-Minerals (CENTRUM PO)   Yes No   Sig: Take 1 Tablet by mouth every day.   acetaminophen (TYLENOL) 325 MG Tab   Yes No   Sig: Take 650 mg by mouth every four hours as needed.   allopurinol (ZYLOPRIM) 300 MG Tab   No No   Sig: Take 1 Tablet by mouth every day.   Patient taking differently: Take 300 mg by mouth every day. AM   cetirizine (ZYRTEC) 10 MG Tab   Yes No   Sig: Take 10 mg by mouth every day.   dexamethasone (DECADRON) 4 MG/ML Solution   Yes No   Patient not taking: Reported on 6/26/2023   fentaNYL  Citrate, PF, 100 MCG/2ML Solution   Yes No   Patient not taking: Reported on 6/26/2023   losartan (COZAAR) 50 MG Tab   Yes No   Sig: Take 50 mg by mouth every day.   meloxicam (MOBIC) 15 MG tablet   No No   Sig: TAKE 1 TABLET BY MOUTH EVERY DAY   meloxicam (MOBIC) 7.5 MG Tab   No No   Sig: Take 1-2 Tablets by mouth 1 time a day as needed for Moderate Pain.   oxyCODONE immediate release (ROXICODONE) 10 MG immediate release tablet   Yes No   Sig: every 6 hours as needed.   oxyCODONE immediate-release (ROXICODONE) 5 MG Tab   Yes No   Sig: every 6 hours as needed.      Facility-Administered Medications: None       Physical Exam  Temp:  [36.4 °C (97.5 °F)] 36.4 °C (97.5 °F)  Pulse:  [66-90] 87  Resp:  [15-20] 17  BP: ()/(45-81) 146/80  SpO2:  [94 %-97 %] 95 %  Blood Pressure : 130/67   Temperature: 36.4 °C (97.5 °F)   Pulse: 84   Respiration: 19   Pulse Oximetry: 97 %       Physical Exam  Vitals reviewed.   Constitutional:       General: She is not in acute distress.     Appearance: Normal appearance. She is not ill-appearing, toxic-appearing or diaphoretic.   HENT:      Head: Normocephalic and atraumatic.      Mouth/Throat:      Mouth: Mucous membranes are moist.      Pharynx: No oropharyngeal exudate or posterior oropharyngeal erythema.      Comments: Poor dentition  Eyes:      General: No scleral icterus.     Extraocular Movements: Extraocular movements intact.      Conjunctiva/sclera: Conjunctivae normal.   Cardiovascular:      Rate and Rhythm: Normal rate and regular rhythm.      Heart sounds: Normal heart sounds. No murmur heard.     No friction rub. No gallop.   Pulmonary:      Effort: Pulmonary effort is normal. No respiratory distress.      Breath sounds: Normal breath sounds. No stridor. No wheezing, rhonchi or rales.   Abdominal:      General: Abdomen is flat. There is no distension.      Palpations: Abdomen is soft. There is no mass.      Tenderness: There is no abdominal tenderness. There is no  guarding or rebound.      Hernia: No hernia is present.   Musculoskeletal:         General: No swelling or tenderness. Normal range of motion.      Cervical back: Neck supple. No rigidity.      Right lower leg: No edema.      Left lower leg: No edema.   Lymphadenopathy:      Cervical: No cervical adenopathy.   Skin:     General: Skin is warm and dry.      Coloration: Skin is not jaundiced.   Neurological:      Mental Status: She is alert and oriented to person, place, and time. Mental status is at baseline.      Cranial Nerves: No cranial nerve deficit.         Laboratory:  Recent Labs     06/19/25 0321   WBC 10.8   RBC 4.40   HEMOGLOBIN 13.9   HEMATOCRIT 42.5   MCV 96.6   MCH 31.6   MCHC 32.7   RDW 48.1   PLATELETCT 291   MPV 8.3*     Recent Labs     06/19/25 0321   SODIUM 138   POTASSIUM 4.1   CHLORIDE 102   CO2 22   GLUCOSE 132*   BUN 18   CREATININE 0.71   CALCIUM 9.4     Recent Labs     06/19/25 0321   ALTSGPT 20   ASTSGOT 33   ALKPHOSPHAT 118*   TBILIRUBIN 0.4   GLUCOSE 132*         Recent Labs     06/19/25  0321   NTPROBNP 53         Recent Labs     06/19/25  0321 06/19/25  0515   TROPONINT 11 13       Imaging:  DX-CHEST-PORTABLE (1 VIEW)   Final Result         1.  Left basilar atelectasis and/or infiltrates.   2.  Cardiomegaly   3.  Atherosclerosis      EC-ECHOCARDIOGRAM COMPLETE W/O CONT    (Results Pending)       X-Ray:  I have personally reviewed the images and compared with prior images.  EKG:  I have personally reviewed the images and compared with prior images.    Assessment/Plan:  Justification for Admission Status  I anticipate this patient is appropriate for observation status at this time because chest pain rule out requiring further workup    Patient will need a Telemetry bed on MEDICAL service .  The need is secondary to chest pain rule out requiring further workup.    * Chest pain, rule out acute myocardial infarction- (present on admission)  Assessment & Plan  Patient with substernal chest  pain since 0100 this morning, waking them up from sleep.  Troponin 11>> 13, NT proBNP 53.  Chest x-ray with left basilar atelectasis.  EKG NSR HR 87, QTc 456, borderline ST elevation in inferior leads.  EKG NSR HR 87, QTc 449, with T wave flattening lead III and aVF.  EKG NSR HR 83, QTc 454, ST elevation in lead II, T wave flattening lead III and aVF.    HEART 7, BENNETT 2, MELONY 130 scores    -Cardiology consulted in the ED  -To be seen in the AM  -Telemetry  -ASA 325mg PO x1 with ASA 81mg PO daily thereafter  -Atorvastatin 80mg daily  -Order TTE  -Order HbA1c, lipid panel, and TSH    Hypotension- (present on admission)  Assessment & Plan  SBP into the 70s after receiving dose of nitroglycerin.    - Avoid nitroglycerin and antihypertensives at this time    Gout- (present on admission)  Assessment & Plan  - Continue home allopurinol    HTN (hypertension)- (present on admission)  Assessment & Plan  - Avoid all antihypertensives in the setting of hypotension; currently on losartan 100 mg daily and amlodipine 10 mg        VTE prophylaxis: SCDs/TEDs and enoxaparin ppx         [1] No Known Allergies

## 2025-06-19 NOTE — ED NOTES
ERP at bedside     PIV placed, labs collected and sent   Family at bedside. Pt on monitor. Reporting 9/10 CP. Denies SOB, nausea at this time.

## 2025-06-19 NOTE — ASSESSMENT & PLAN NOTE
SBP into the 70s after receiving dose of nitroglycerin.    - Avoid nitroglycerin and antihypertensives at this time

## 2025-06-19 NOTE — PROGRESS NOTES
Patient presenting to ER with CP x1 hr at 3 am.     Troponin x2 and initial ECG unremarkable for ACS but with AL depression. Repeat ECG now showing widespread BUCKY, AL depression typical of pericarditis.     CXR cardiomegaly    Likely diagnosis is pericarditis    Recommendations  Echo, colchicine    Full cardiology consult to follow

## 2025-06-19 NOTE — HOSPITAL COURSE
Patient is a 79-year-old female with past medical history of HTN, gout that presents with chest pain since 0100.     Patient states that they were woken up from sleep due to substernal chest pressure starting at 0100.  Patient states that it does not radiate anywhere.  States that they are still having this chest pain at this time.  States that they were recently started on additional antihypertensive last week per their PCP.     In the ED, BP 70s to 140s/40s to 80s, HR 70s to 90s, RR 15-20, afebrile, saturating well on room air.  Received LR 1 L bolus x 1, nitroglycerin 0.4 mg SL x 1.  CBC unremarkable.  Potassium 4.1, bicarb 22, anion gap 14, BUN 18, creatinine 0.71, alk phos 118, troponin 11>> 13, NT proBNP 53.  Chest x-ray with left basilar atelectasis.  EKG NSR HR 87, QTc 456, borderline ST elevation in inferior leads.  EKG NSR HR 87, QTc 449, with T wave flattening lead III and aVF.  EKG NSR HR 83, QTc 454, ST elevation in lead II, T wave flattening lead III and aVF.

## 2025-06-19 NOTE — ED NOTES
Pt resting comfortably in bed at this time. NAD. Family at bedside. Call light within reach. Bed locked in lowest position

## 2025-06-19 NOTE — CONSULTS
Cardiology Initial Consultation    Date of Service  6/19/2025    Referring Physician  KRYSTAL Hines.    Reason for Consultation  Pericarditis.    History of Presenting Illness  Shital Hemphill is a 79 y.o. female with a past medical history of hypertension, dyslipidemia, lifelong nonsmoker, no family history of coronary artery disease who presented 6/19/2025 with chest pain. She describes her chest pain to be 9/10 heaviness sensation of her mid chest without radiation, lasting 3 hours relieved with nitro. She denies associated shortness of breath, palpitations, diaphoresis, nausea and vomiting. She  underwent an EKG which was abnormal suggestive of pericarditis.     Review of Systems  Review of Systems   Constitutional: Negative.  Negative for chills and fever.   HENT: Negative.  Negative for hearing loss.    Eyes: Negative.    Respiratory: Negative.  Negative for cough and shortness of breath.    Cardiovascular:  Positive for chest pain. Negative for palpitations and leg swelling.   Gastrointestinal: Negative.  Negative for abdominal pain, nausea and vomiting.   Genitourinary: Negative.  Negative for dysuria and urgency.   Musculoskeletal: Negative.  Negative for myalgias.   Skin: Negative.  Negative for rash.   Neurological: Negative.  Negative for dizziness, weakness and headaches.   Hematological:  Does not bruise/bleed easily.   Psychiatric/Behavioral: Negative.  The patient is not nervous/anxious.        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    Surgical History   has a past surgical history that includes cholecystectomy; abdominal hysterectomy total; pr total knee arthroplasty (Right, 04/06/2023); cataract extraction with iol (Bilateral); and pr total knee arthroplasty (Left, 7/13/2023).    Family History  Family History   Problem  Relation Age of Onset    Hypertension Mother        Social History   reports that she has never smoked. She has never used smokeless tobacco. She reports that she does not drink alcohol and does not use drugs.    Medications  Prior to Admission Medications   Prescriptions Last Dose Informant Patient Reported? Taking?   Multiple Vitamins-Minerals (CENTRUM PO) 6/18/2025 Noon Patient, Family Member Yes Yes   Sig: Take 1 Tablet by mouth every day.   allopurinol (ZYLOPRIM) 300 MG Tab 6/18/2025 Noon Patient, Family Member No Yes   Sig: Take 1 Tablet by mouth every day.   amLODIPine (NORVASC) 10 MG Tab 6/18/2025 Noon Patient, Family Member Yes Yes   Sig: Take 10 mg by mouth every day.   cephALEXin (KEFLEX) 500 MG Cap 5/22/2025 Patient, Family Member Yes Yes   Sig: Take 500 mg by mouth 2 times a day. 7 day course completed   cetirizine (ZYRTEC) 10 MG Tab 6/18/2025 Noon Patient, Family Member Yes Yes   Sig: Take 10 mg by mouth every day.   losartan (COZAAR) 100 MG Tab 6/18/2025 Noon Patient, Family Member Yes Yes   Sig: Take 100 mg by mouth every day.      Facility-Administered Medications: None       Allergies  Allergies[1]    Vital signs in last 24 hours  Temp:  [36.4 °C (97.5 °F)] 36.4 °C (97.5 °F)  Pulse:  [66-90] 83  Resp:  [15-20] 18  BP: ()/(45-81) 132/75  SpO2:  [94 %-97 %] 94 %    Physical Exam  Physical Exam  Constitutional:       Appearance: Normal appearance. She is well-developed and normal weight.   HENT:      Head: Normocephalic and atraumatic.      Mouth/Throat:      Mouth: Mucous membranes are moist.   Eyes:      Extraocular Movements: Extraocular movements intact.      Conjunctiva/sclera: Conjunctivae normal.   Cardiovascular:      Rate and Rhythm: Normal rate and regular rhythm.      Pulses: Normal pulses.      Heart sounds: Normal heart sounds.   Pulmonary:      Effort: Pulmonary effort is normal.      Breath sounds: Normal breath sounds.   Abdominal:      General: Bowel sounds are normal.       Palpations: Abdomen is soft.   Musculoskeletal:         General: Normal range of motion.      Cervical back: Normal range of motion and neck supple.   Skin:     General: Skin is warm and dry.   Neurological:      General: No focal deficit present.      Mental Status: She is alert and oriented to person, place, and time. Mental status is at baseline.   Psychiatric:         Mood and Affect: Mood normal.         Behavior: Behavior normal.         Thought Content: Thought content normal.         Judgment: Judgment normal.         Lab Review  Lab Results   Component Value Date/Time    WBC 10.8 06/19/2025 03:21 AM    RBC 4.40 06/19/2025 03:21 AM    HEMOGLOBIN 13.9 06/19/2025 03:21 AM    HEMATOCRIT 42.5 06/19/2025 03:21 AM    MCV 96.6 06/19/2025 03:21 AM    MCH 31.6 06/19/2025 03:21 AM    MCHC 32.7 06/19/2025 03:21 AM    MPV 8.3 (L) 06/19/2025 03:21 AM      Lab Results   Component Value Date/Time    SODIUM 138 06/19/2025 03:21 AM    POTASSIUM 4.1 06/19/2025 03:21 AM    CHLORIDE 102 06/19/2025 03:21 AM    CO2 22 06/19/2025 03:21 AM    GLUCOSE 132 (H) 06/19/2025 03:21 AM    BUN 18 06/19/2025 03:21 AM    CREATININE 0.71 06/19/2025 03:21 AM      Lab Results   Component Value Date/Time    ASTSGOT 33 06/19/2025 03:21 AM    ALTSGPT 20 06/19/2025 03:21 AM     Lab Results   Component Value Date/Time    CHOLSTRLTOT 171 06/19/2025 05:15 AM    LDL 79 06/19/2025 05:15 AM    HDL 74 06/19/2025 05:15 AM    TRIGLYCERIDE 92 06/19/2025 05:15 AM    TROPONINT 13 06/19/2025 05:15 AM       Recent Labs     06/19/25  0321   NTPROBNP 53       Cardiac Imaging and Procedures Review  CARDIAC STUDIES/PROCEDURES:    ECHOCARDIOGRAM CONCLUSIONS (04/07/22)  No prior study is available for comparison.   Normal left ventricular chamber size.  The left ventricular ejection fraction is visually estimated to be 55%.  Normal inferior vena cava size and inspiratory collapse.  Right ventricular systolic pressure is estimated to be  35  mmHg.  (study result  reviewed)     EKG performed on (06/19/25) was reviewed: EKG personally interpreted shows sinus rhythm.     Assessment/Plan  Pericarditis: She is a 79 y.o. female with a past medical history of hypertension who presented 6/19/2025 with chest pain. She  underwent an EKG which was abnormal suggestive of pericarditis. We will treat with NSAIDS and colchicine 0.6 mg BID x 3 months. We will perform an echocardiogram and myocardial perfusion imaging study.    Thank you for allowing me to participate in the care of this patient.    I will continue to follow this patient    Please contact me with any questions.    Juan Miguel Stuart M.D.   Cardiologist, [facility]  [phone]               [1] No Known Allergies

## 2025-06-19 NOTE — ASSESSMENT & PLAN NOTE
- Avoid all antihypertensives in the setting of hypotension; currently on losartan 100 mg daily and amlodipine 10 mg

## 2025-06-19 NOTE — ED NOTES
Pt medicated per MAR. Pt reports continued chest discomfort. Family at bedside. Updated on POC, awaiting admit bed and stress test.

## 2025-06-19 NOTE — PROGRESS NOTES
[facility] Medicine Daily Progress Note    Date of Service  6/19/2025    Chief Complaint  Shital Hemphill is a 79 y.o. female admitted 6/19/2025 with chest pain.    Hospital Course  Patient is a 79-year-old female with past medical history of HTN, gout that presents with chest pain since 0100.     Patient states that they were woken up from sleep due to substernal chest pressure starting at 0100.  Patient states that it does not radiate anywhere.  States that they are still having this chest pain at this time.  States that they were recently started on additional antihypertensive last week per their PCP.     In the ED, BP 70s to 140s/40s to 80s, HR 70s to 90s, RR 15-20, afebrile, saturating well on room air.  Received LR 1 L bolus x 1, nitroglycerin 0.4 mg SL x 1.  CBC unremarkable.  Potassium 4.1, bicarb 22, anion gap 14, BUN 18, creatinine 0.71, alk phos 118, troponin 11>> 13, NT proBNP 53.  Chest x-ray with left basilar atelectasis.  EKG NSR HR 87, QTc 456, borderline ST elevation in inferior leads.  EKG NSR HR 87, QTc 449, with T wave flattening lead III and aVF.  EKG NSR HR 83, QTc 454, ST elevation in lead II, T wave flattening lead III and aVF.    Interval Problem Update    Cardiology consulted, suspecting pericarditis started on colchicine and NSAIDs.  Patient also pending echo and will have stress test in AM.  At bedside no current or patient reports of chest pain, shortness of breath, nausea vomiting, jaw pain, back pain.  Patient currently afebrile, room air, and other vital signs stable.    I have discussed this patient's plan of care and discharge plan at IDT rounds today with Case Management, Nursing, Nursing leadership, and other members of the IDT team.    Consultants/Specialty  cardiology    Code Status  Full Code    Disposition  The patient is not medically cleared for discharge to home or a post-acute facility.      I have placed the appropriate orders for post-discharge needs.    Review of  Systems  Review of Systems   Constitutional: Negative.    HENT: Negative.     Eyes: Negative.    Respiratory: Negative.     Cardiovascular: Negative.    Gastrointestinal: Negative.    Genitourinary: Negative.    Musculoskeletal: Negative.    Skin: Negative.    Neurological: Negative.    Endo/Heme/Allergies: Negative.    Psychiatric/Behavioral: Negative.          Physical Exam  Temp:  [36.4 °C (97.5 °F)] 36.4 °C (97.5 °F)  Pulse:  [66-90] 80  Resp:  [15-20] 19  BP: ()/(45-98) 120/60  SpO2:  [94 %-97 %] 94 %    Physical Exam  Constitutional:       Appearance: Normal appearance.   HENT:      Head: Normocephalic and atraumatic.      Mouth/Throat:      Mouth: Mucous membranes are moist.      Pharynx: Oropharynx is clear.   Eyes:      Pupils: Pupils are equal, round, and reactive to light.   Cardiovascular:      Rate and Rhythm: Normal rate and regular rhythm.   Pulmonary:      Effort: Pulmonary effort is normal.      Breath sounds: Normal breath sounds.   Skin:     General: Skin is warm and dry.      Capillary Refill: Capillary refill takes less than 2 seconds.   Neurological:      Mental Status: She is alert and oriented to person, place, and time.         Fluids    Intake/Output Summary (Last 24 hours) at 6/19/2025 1354  Last data filed at 6/19/2025 0450  Gross per 24 hour   Intake 1000 ml   Output --   Net 1000 ml        Laboratory  Recent Labs     06/19/25  0321   WBC 10.8   RBC 4.40   HEMOGLOBIN 13.9   HEMATOCRIT 42.5   MCV 96.6   MCH 31.6   MCHC 32.7   RDW 48.1   PLATELETCT 291   MPV 8.3*     Recent Labs     06/19/25  0321   SODIUM 138   POTASSIUM 4.1   CHLORIDE 102   CO2 22   GLUCOSE 132*   BUN 18   CREATININE 0.71   CALCIUM 9.4             Recent Labs     06/19/25  0515   TRIGLYCERIDE 92   HDL 74   LDL 79       Imaging      Assessment/Plan  * Chest pain, rule out acute myocardial infarction- (present on admission)  Assessment & Plan  Patient with substernal chest pain since 0100 this morning, waking them up  from sleep.  Troponin 11>> 13, NT proBNP 53.  Chest x-ray with left basilar atelectasis.  EKG NSR HR 87, QTc 456, borderline ST elevation in inferior leads.  EKG NSR HR 87, QTc 449, with T wave flattening lead III and aVF.  EKG NSR HR 83, QTc 454, ST elevation in lead II, T wave flattening lead III and aVF.    HEART 7, BENENTT 2, MELONY 130 scores    -Cardiology consulted   -Suspect pericarditis, recommending echo and stress test.  -Telemetry  -ASA 325mg PO x1 with ASA 81mg PO daily thereafter  -Atorvastatin 80mg daily  -Order HbA1c, lipid panel, and TSH    Hypotension- (present on admission)  Assessment & Plan  SBP into the 70s after receiving dose of nitroglycerin.    - Avoid nitroglycerin and antihypertensives at this time    Gout- (present on admission)  Assessment & Plan  - Continue home allopurinol    HTN (hypertension)- (present on admission)  Assessment & Plan  - Avoid all antihypertensives in the setting of hypotension; currently on losartan 100 mg daily and amlodipine 10 mg         VTE prophylaxis:   SCDs/TEDs      I have performed a physical exam and reviewed and updated ROS and Plan today (6/19/2025). In review of yesterday's note (6/18/2025), there are no changes except as documented above.

## 2025-06-19 NOTE — ED NOTES
Pt and family updated on POC, ETA for stress test approx 1000. Call light within reach. No further needs at this time.

## 2025-06-19 NOTE — ED NOTES
Repeat troponin collected and sent to lab.     Family at bedside. Pt resting comfortably in bed at this time. Reporting improvement of CP, 5/10 pain at this time. NAD. Call light within reach

## 2025-06-19 NOTE — ED NOTES
Med Rec completed per patient with daughter translating      Allergies reviewed: yes    Oral antibiotics in the past 30 days: yes  Keflex 500 mg BID. 7 day course completed 05/22/2025    Anticoagulant in past 14 days: no    Dispense history available in EPIC: yes     Pharmacy patient utilizes: [facility] [id number]   [phone]

## 2025-06-19 NOTE — ED TRIAGE NOTES
"Chief Complaint   Patient presents with    Chest Pain     Patient BIBA from home for chest pain x 1 hour. Patient states HTN as only medical hx. Patient visibly uncomfortable in triage. States she was asleep when the pain started.        Pt is alert and oriented, speaking in full sentences, follows commands and responds appropriately to questions. Resperations are even and unlabored.      Pt placed in lobby. Pt educated on triage process. Pt encouraged to alert staff for any changes.     Patient and staff wearing appropriate PPE.    /81   Pulse 90   Temp 36.4 °C (97.5 °F) (Temporal)   Resp 20   Ht 1.575 m (5' 2\")   Wt 106 kg (233 lb)   SpO2 96%    "

## 2025-06-19 NOTE — ED NOTES
Pt provided with water and apple sauce. Family at bedside. Pt reattached to continuous monitoring. No further needs at this time.

## 2025-06-19 NOTE — ED PROVIDER NOTES
ED Provider Note    CHIEF COMPLAINT  Chief Complaint   Patient presents with    Chest Pain     Patient BIBA from home for chest pain x 1 hour. Patient states HTN as only medical hx. Patient visibly uncomfortable in triage. States she was asleep when the pain started.        LIMITATION TO HISTORY   Select: None    HPI    Shital Hemphill is a 79 y.o. female with a history of hypertension who presents to the Emergency Department via EMS for evaluation of acute chest pain onset one hour ago. The patient explains that she was awoken from her sleep tonight with a heavy chest pain which prompted her to call EMS for transport to the ED. The patient was medicated with Aspirin by EMS while en route and she reports her current pain as a 9/10 in severity. Patient denies her pain radiating anywhere and she has no associated symptoms. She reports a familial history of strokes but denies any family history of MI. Denies any tobacco use.     OUTSIDE HISTORIAN(S):  Select: Family member present at bedside to provide additional context to history    EXTERNAL RECORDS REVIEWED  Select: The patient's records show the patient was seen at [facility] ED on 5/15/25 for evaluation of left sided flank pain. She received reassuring workup and patient was provided with oral Keflex prescription for treatment of possible UTI.     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     Pain     knee pain, shoulder pain     SURGICAL HISTORY  Past Surgical History:   Procedure Laterality Date    PB TOTAL KNEE ARTHROPLASTY Left 7/13/2023    Procedure: ARTHROPLASTY, KNEE, TOTAL;  Surgeon: Srinivasan Salinas M.D.;  Location: SURGERY [facility];  Service: Orthopedics    PB TOTAL KNEE ARTHROPLASTY Right 04/06/2023     Procedure: ARTHROPLASTY, KNEE, TOTAL;  Surgeon: Srinivasan Salinas M.D.;  Location: SURGERY [facility];  Service: Orthopedics    ABDOMINAL HYSTERECTOMY TOTAL      or tubal ligation, unsure    CATARACT EXTRACTION WITH IOL Bilateral     CHOLECYSTECTOMY       FAMILY HISTORY  Family History   Problem Relation Age of Onset    Hypertension Mother       SOCIAL HISTORY  Social History     Socioeconomic History    Marital status:      Spouse name: None noted    Number of children: None noted    Years of education: None noted    Highest education level: None noted   Occupational History    None noted   Tobacco Use    Smoking status: Never    Smokeless tobacco: Never   Vaping Use    Vaping status: Never Used   Substance and Sexual Activity    Alcohol use: Never    Drug use: Never    Sexual activity: Not Currently   Other Topics Concern    None noted   Social History Narrative    None noted     Social Drivers of Health     Financial Resource Strain: None noted   Food Insecurity: None noted   Transportation Needs: None noted   Physical Activity: None noted   Stress: None noted   Social Connections: None noted   Intimate Partner Violence: None noted   Housing Stability: None noted     CURRENT MEDICATIONS  Current Outpatient Medications on File Prior to Encounter   Medication Sig Dispense Refill    meloxicam (MOBIC) 15 MG tablet TAKE 1 TABLET BY MOUTH EVERY DAY 30 Tablet 1    ASPIRIN LOW DOSE 81 MG EC tablet  (Patient not taking: Reported on 6/26/2023)      dexamethasone (DECADRON) 4 MG/ML Solution  (Patient not taking: Reported on 6/26/2023)      fentaNYL Citrate, PF, 100 MCG/2ML Solution  (Patient not taking: Reported on 6/26/2023)      losartan (COZAAR) 50 MG Tab Take 50 mg by mouth every day.      oxyCODONE immediate release (ROXICODONE) 10 MG immediate release tablet every 6 hours as needed.      oxyCODONE immediate-release (ROXICODONE) 5 MG Tab every 6 hours as needed.      acetaminophen (TYLENOL) 325 MG Tab Take 650 mg  "by mouth every four hours as needed.      allopurinol (ZYLOPRIM) 300 MG Tab Take 1 Tablet by mouth every day. (Patient taking differently: Take 300 mg by mouth every day. AM) 30 Tablet 0    meloxicam (MOBIC) 7.5 MG Tab Take 1-2 Tablets by mouth 1 time a day as needed for Moderate Pain. 30 Tablet 1    Multiple Vitamins-Minerals (CENTRUM PO) Take 1 Tablet by mouth every day.      Glucosamine HCl (GLUCOSAMINE PO) Take 1 Tablet by mouth 2 times a day.      cetirizine (ZYRTEC) 10 MG Tab Take 10 mg by mouth every day.       ALLERGIES  No Known Allergies    PHYSICAL EXAM  VITAL SIGNS:/81   Pulse 90   Temp 36.4 °C (97.5 °F) (Temporal)   Resp 20   Ht 1.575 m (5' 2\")   Wt 106 kg (233 lb)   SpO2 96%   BMI 42.62 kg/m²       GENERAL: Awake and alert  HEAD: Normocephalic and atraumatic  NECK: Normal range of motion, without meningismus  EYES: Pupils Equal, Round, Reactive to Light, extraocular movements intact, conjunctiva white  ENT: Mucous membranes moist, oropharynx clear  PULMONARY: Normal effort, clear to auscultation  CARDIOVASCULAR: No murmurs, clicks or rubs, peripheral pulses 2+  ABDOMINAL: Soft, non-tender, no guarding or rigidity present, no pulsatile masses  BACK: no midline tenderness, no costovertebral tenderness  NEUROLOGICAL: Grossly non-focal neurological examination, speech normal, gait normal  EXTREMITIES: No edema, normal to inspection  SKIN: Warm and dry.  PSYCHIATRIC: Affect is appropriate    DIAGNOSTIC STUDIES / PROCEDURES    EKG  I have independently interpreted this EKG  Results for orders placed or performed during the hospital encounter of 25   EKG   Result Value Ref Range    Report       [facility] Emergency Dept.    Test Date:  2025  Pt Name:    JORGE ORTIZ                 Department: ER  MRN:        [id number]                      Room:  Gender:     Female                       Technician: [id number]  :        1945                   Requested By:ER " TRIAGE PROTOCOL  Order #:    [id number]                    Reading MD: Jamaal Toro    Measurements  Intervals                                Axis  Rate:       87                           P:          48  MO:         157                          QRS:        16  QRSD:       101                          T:          83  QT:         379  QTc:        456    Interpretive Statements  Sinus rhythm  Minimal ST elevation, inferior leads  Compared to ECG 2023 10:53:00  ST (T wave) deviation now present  Electronically Signed On 2025 07:00:07 PDT by Jamaal Toro     EKG   Result Value Ref Range    Report       [facility] Emergency Dept.    Test Date:  2025  [facility]                 Department: ER  MRN:        [id number]                      Room:  Gender:     Female                       Technician: [id number]  :        1945                   Requested By:ER TRIAGE PROTOCOL  Order #:    [id number]                    Reading MD: Jamaal Toro    Measurements  Intervals                                Axis  Rate:       87                           P:          56  MO:         152                          QRS:        26  QRSD:       99                           T:          54  QT:         373  QTc:        449    Interpretive Statements  Sinus rhythm  Compared to ECG 2025 03:02:46  ST (T wave) deviation no longer present  Electronically Signed On 2025 07:00:18 PDT by Jamaal Toro     EKG   Result Value Ref Range    Report       [facility] Emergency Dept.    Test Date:  2025  Pt Name:    [facility]                 Department: ER  MRN:        [id number]                      Room:       RD 12  Gender:     Female                       Technician: [id number]  :        1945                   Requested By:JAMAAL TORO  Order #:    [id number]                    Reading MD: Jamaal Toro    Measurements  Intervals                                 Axis  Rate:       83                           P:          48  WI:         161                          QRS:        13  QRSD:       102                          T:          49  QT:         386  QTc:        454    Interpretive Statements  Sinus rhythm  ST elevation, consider inferior injury  Compared to ECG 06/19/2025 03:17:49  ST (T wave) deviation now present  Electronically Signed On 06- 07:00:42 PDT by Jamaal Hylton       LABS  Labs Reviewed   CBC WITH DIFFERENTIAL - Abnormal; Notable for the following components:       Result Value    MPV 8.3 (*)     Neutrophils-Polys 73.00 (*)     Lymphocytes 16.50 (*)     Neutrophils (Absolute) 7.88 (*)     All other components within normal limits   COMP METABOLIC PANEL - Abnormal; Notable for the following components:    Glucose 132 (*)     Alkaline Phosphatase 118 (*)     Globulin 3.7 (*)     All other components within normal limits   HEMOGLOBIN A1C - Abnormal; Notable for the following components:    Glycohemoglobin 6.2 (*)     All other components within normal limits   PROBRAIN NATRIURETIC PEPTIDE, NT   TROPONIN   TROPONIN   ESTIMATED GFR   URINE DRUG SCREEN   LIPID PROFILE   TSH WITH REFLEX TO FT4   DIAGNOSTIC ALCOHOL   All labs reviewed by me.     RADIOLOGY  I have independently interpreted the diagnostic imaging associated with this visit and am waiting the final reading from the radiologist.   My preliminary interpretation is as follows: No dense infiltrate  Formal Radiologist interpretation:  DX-CHEST-PORTABLE (1 VIEW)   Final Result         1.  Left basilar atelectasis and/or infiltrates.   2.  Cardiomegaly   3.  Atherosclerosis      NM-CARDIAC STRESS TEST    (Results Pending)   EC-ECHOCARDIOGRAM COMPLETE W/O CONT    (Results Pending)     COURSE & MEDICAL DECISION MAKING    ED COURSE:    INTERVENTIONS BY ME:  Medications   enoxaparin (Lovenox) inj 40 mg (has no administration in time range)   acetaminophen (Tylenol) tablet 650 mg (has no administration  in time range)   senna-docusate (Pericolace Or Senokot S) 8.6-50 MG per tablet 2 Tablet (has no administration in time range)     And   polyethylene glycol/lytes (Miralax) Packet 1 Packet (has no administration in time range)   ondansetron (Zofran) syringe/vial injection 4 mg (has no administration in time range)     Or   ondansetron (Zofran ODT) dispertab 4 mg (has no administration in time range)   aspirin EC tablet 81 mg (has no administration in time range)   atorvastatin (Lipitor) tablet 80 mg (0 mg Oral Dose not Required 25 0700)   allopurinol (Zyloprim) tablet 300 mg (300 mg Oral Given 25 1234)   losartan (Cozaar) tablet 100 mg ( Oral Automatically Held 25 0600)   amLODIPine (Norvasc) tablet 10 mg ( Oral Automatically Held 25 0600)   colchicine (Colcrys) tablet 0.6 mg (0.6 mg Oral Given 25 0805)   ibuprofen (Motrin) tablet 600 mg (600 mg Oral Given 25 0805)   Bolus of LR (0 mL Intravenous Stopped 25 0450)     Response on recheck:    3:09 AM - Was told by triage tech about patient. I reviewed her triage EKG and will order repeat EKG in 10 minutes.     3:23 AM - Patient seen and examined at bedside.     6:04 AM - Paged hospitalist.      - I discussed the patient's case and the above findings with Dr. Miller (Hospitalist) who agrees to evaluate the patient for hospitalization.   Discussed the case with cardiologist on-call Dr. Tavera, discussed patient's symptoms EKG at this time we will arrange for echocardiogram discussed troponins and prior EKG plan will be not distressed patient but to order echocardiogram first.  Cardiology will consult later in the morning.        INITIAL ASSESSMENT, COURSE AND PLAN  Care Narrative:     HEART Score for Major Cardiac Events  HEART Score     History:  1  EC  Age:  2  Risk Factors:  1  Troponin:  0    Heart Score:  5    This is a 79-year-old female with a history of hypertension who presented with acute-onset substernal chest  pain that awoke her from sleep. Her pain was described as heavy and non-radiating, without associated symptoms such as dyspnea, diaphoresis, or syncope. Initial EKG demonstrated minimal ST elevation in the inferior leads with new changes compared to prior, which resolved on repeat EKG--raising concern for demand ischemia or early ACS. The patient's troponins remained negative throughout serial measurements. Given her age, history of hypertension, and abnormal EKG, she remains at moderate risk for a major cardiac event (HEART score of 5). Her chest X-ray showed cardiomegaly and basilar changes consistent with possible atelectasis, but no clear pulmonary source of her symptoms. Nitroglycerin was given with some improvement although she became hypotensive after this. I discussed the case with both hospitalist and cardiology. Patient will be admitted for telemetry monitoring and echocardiogram.  Cardiology recommended holding stress testing until results of echocardiogram discussed this with hospitalist.  This time given symptoms and EKG do not suspect ACS.  Cardiology will re-evaluate in the morning for further workup of possible ACS.  Patient symptomatology is not consistent with pulmonary embolism she has no pleuritic pain she has no other hypoxia nor tachycardia no pleuritic pain.      ADDITIONAL PROBLEM LIST      DISPOSITION AND DISCUSSIONS  Discussion of management with other [facility] or appropriate source(s): None    I have discussed management of the patient with the following physicians and MONTSERRAT's: Dr. Miller (Hospitalist)    Barriers to care at this time, including but not limited to: None.     Decision tools and prescription drugs considered including, but not limited to: None    DISPOSITION:  Patient will be hospitalized by Dr. Miller (Hospitalist) in guarded condition.  FINAL DIAGNOSIS  1. Chest pain, unspecified type    2. Multiple risk factors for coronary artery disease    3. Abnormal EKG       I, Gamaliel Rosa (Scribe), am scribing for, and in the presence of, Jamaal Hylton.    Electronically signed by: Gamaliel Rosa (Scribe), 6/19/2025    IJamaal personally performed the services described in this documentation, as scribed by Gamaliel Rosa in my presence, and it is both accurate and complete.     Electronically signed by: Jamaal Hylton DO ,1:45 PM 06/19/25

## 2025-06-19 NOTE — ED NOTES
Pt unable to complete stress test due to nitro administration, admitting notified. Diet order placed.

## 2025-06-20 ENCOUNTER — APPOINTMENT (OUTPATIENT)
Dept: CARDIOLOGY | Facility: MEDICAL CENTER | Age: 80
End: 2025-06-20
Payer: MEDICARE

## 2025-06-20 ENCOUNTER — PHARMACY VISIT (OUTPATIENT)
Dept: PHARMACY | Facility: MEDICAL CENTER | Age: 80
End: 2025-06-20
Payer: COMMERCIAL

## 2025-06-20 ENCOUNTER — APPOINTMENT (OUTPATIENT)
Dept: RADIOLOGY | Facility: MEDICAL CENTER | Age: 80
End: 2025-06-20
Payer: MEDICARE

## 2025-06-20 VITALS
WEIGHT: 240.3 LBS | HEART RATE: 83 BPM | TEMPERATURE: 97.4 F | SYSTOLIC BLOOD PRESSURE: 150 MMHG | OXYGEN SATURATION: 96 % | HEIGHT: 63 IN | DIASTOLIC BLOOD PRESSURE: 83 MMHG | BODY MASS INDEX: 42.58 KG/M2 | RESPIRATION RATE: 16 BRPM

## 2025-06-20 PROBLEM — R07.9 CHEST PAIN, RULE OUT ACUTE MYOCARDIAL INFARCTION: Status: RESOLVED | Noted: 2025-06-19 | Resolved: 2025-06-20

## 2025-06-20 PROBLEM — I31.9 PERICARDITIS: Status: ACTIVE | Noted: 2025-06-20

## 2025-06-20 PROBLEM — I95.9 HYPOTENSION: Status: RESOLVED | Noted: 2025-06-19 | Resolved: 2025-06-20

## 2025-06-20 LAB
ANION GAP SERPL CALC-SCNC: 11 MMOL/L (ref 7–16)
BUN SERPL-MCNC: 19 MG/DL (ref 8–22)
CALCIUM SERPL-MCNC: 8.6 MG/DL (ref 8.5–10.5)
CHLORIDE SERPL-SCNC: 107 MMOL/L (ref 96–112)
CO2 SERPL-SCNC: 21 MMOL/L (ref 20–33)
CREAT SERPL-MCNC: 0.7 MG/DL (ref 0.5–1.4)
ERYTHROCYTE [DISTWIDTH] IN BLOOD BY AUTOMATED COUNT: 48.5 FL (ref 35.9–50)
GFR SERPLBLD CREATININE-BSD FMLA CKD-EPI: 88 ML/MIN/1.73 M 2
GLUCOSE SERPL-MCNC: 107 MG/DL (ref 65–99)
HCT VFR BLD AUTO: 36.5 % (ref 37–47)
HGB BLD-MCNC: 12.2 G/DL (ref 12–16)
LV EJECT FRACT  99904: 56
LV EJECT FRACT MOD 2C 99903: 57.26
LV EJECT FRACT MOD 4C 99902: 54.5
LV EJECT FRACT MOD BP 99901: 55.58
MCH RBC QN AUTO: 32.3 PG (ref 27–33)
MCHC RBC AUTO-ENTMCNC: 33.4 G/DL (ref 32.2–35.5)
MCV RBC AUTO: 96.6 FL (ref 81.4–97.8)
PLATELET # BLD AUTO: 252 K/UL (ref 164–446)
PMV BLD AUTO: 8.5 FL (ref 9–12.9)
POTASSIUM SERPL-SCNC: 3.9 MMOL/L (ref 3.6–5.5)
RBC # BLD AUTO: 3.78 M/UL (ref 4.2–5.4)
SODIUM SERPL-SCNC: 139 MMOL/L (ref 135–145)
TROPONIN T SERPL-MCNC: 12 NG/L (ref 6–19)
WBC # BLD AUTO: 7.6 K/UL (ref 4.8–10.8)

## 2025-06-20 PROCEDURE — A9502 TC99M TETROFOSMIN: HCPCS

## 2025-06-20 PROCEDURE — 99214 OFFICE O/P EST MOD 30 MIN: CPT | Performed by: INTERNAL MEDICINE

## 2025-06-20 PROCEDURE — A9270 NON-COVERED ITEM OR SERVICE: HCPCS | Mod: UD | Performed by: STUDENT IN AN ORGANIZED HEALTH CARE EDUCATION/TRAINING PROGRAM

## 2025-06-20 PROCEDURE — 700111 HCHG RX REV CODE 636 W/ 250 OVERRIDE (IP): Mod: UD

## 2025-06-20 PROCEDURE — G0378 HOSPITAL OBSERVATION PER HR: HCPCS

## 2025-06-20 PROCEDURE — 99239 HOSP IP/OBS DSCHRG MGMT >30: CPT | Performed by: INTERNAL MEDICINE

## 2025-06-20 PROCEDURE — 93306 TTE W/DOPPLER COMPLETE: CPT | Mod: 26 | Performed by: INTERNAL MEDICINE

## 2025-06-20 PROCEDURE — 85027 COMPLETE CBC AUTOMATED: CPT

## 2025-06-20 PROCEDURE — 36415 COLL VENOUS BLD VENIPUNCTURE: CPT

## 2025-06-20 PROCEDURE — 80048 BASIC METABOLIC PNL TOTAL CA: CPT

## 2025-06-20 PROCEDURE — A9270 NON-COVERED ITEM OR SERVICE: HCPCS | Mod: UD

## 2025-06-20 PROCEDURE — 700102 HCHG RX REV CODE 250 W/ 637 OVERRIDE(OP): Mod: UD | Performed by: STUDENT IN AN ORGANIZED HEALTH CARE EDUCATION/TRAINING PROGRAM

## 2025-06-20 PROCEDURE — 93306 TTE W/DOPPLER COMPLETE: CPT

## 2025-06-20 PROCEDURE — RXMED WILLOW AMBULATORY MEDICATION CHARGE: Performed by: INTERNAL MEDICINE

## 2025-06-20 PROCEDURE — 700102 HCHG RX REV CODE 250 W/ 637 OVERRIDE(OP): Mod: UD

## 2025-06-20 PROCEDURE — 84484 ASSAY OF TROPONIN QUANT: CPT

## 2025-06-20 RX ORDER — COLCHICINE 0.6 MG/1
0.6 TABLET ORAL 2 TIMES DAILY
Qty: 180 TABLET | Refills: 0 | Status: SHIPPED | OUTPATIENT
Start: 2025-06-20 | End: 2025-09-18

## 2025-06-20 RX ORDER — REGADENOSON 0.08 MG/ML
0.4 INJECTION, SOLUTION INTRAVENOUS ONCE
Status: COMPLETED | OUTPATIENT
Start: 2025-06-20 | End: 2025-06-20

## 2025-06-20 RX ORDER — REGADENOSON 0.08 MG/ML
INJECTION, SOLUTION INTRAVENOUS
Status: COMPLETED
Start: 2025-06-20 | End: 2025-06-20

## 2025-06-20 RX ORDER — COLCHICINE 0.6 MG/1
0.6 TABLET ORAL 2 TIMES DAILY
Qty: 90 TABLET | Refills: 0 | Status: SHIPPED | OUTPATIENT
Start: 2025-06-20 | End: 2025-06-20

## 2025-06-20 RX ORDER — AMINOPHYLLINE 25 MG/ML
100 INJECTION, SOLUTION INTRAVENOUS
Status: DISCONTINUED | OUTPATIENT
Start: 2025-06-20 | End: 2025-06-20 | Stop reason: HOSPADM

## 2025-06-20 RX ORDER — IBUPROFEN 600 MG/1
600 TABLET, FILM COATED ORAL EVERY 8 HOURS
Qty: 42 TABLET | Refills: 0 | Status: SHIPPED | OUTPATIENT
Start: 2025-06-20 | End: 2025-07-04

## 2025-06-20 RX ADMIN — REGADENOSON 0.4 MG: 0.08 INJECTION, SOLUTION INTRAVENOUS at 08:53

## 2025-06-20 RX ADMIN — IBUPROFEN 600 MG: 600 TABLET ORAL at 05:42

## 2025-06-20 RX ADMIN — IBUPROFEN 600 MG: 600 TABLET ORAL at 13:53

## 2025-06-20 RX ADMIN — ALLOPURINOL 300 MG: 100 TABLET ORAL at 05:42

## 2025-06-20 RX ADMIN — COLCHICINE 0.6 MG: 0.6 TABLET, FILM COATED ORAL at 05:41

## 2025-06-20 RX ADMIN — ASPIRIN 81 MG: 81 TABLET, COATED ORAL at 05:42

## 2025-06-20 ASSESSMENT — ENCOUNTER SYMPTOMS
VOMITING: 0
GASTROINTESTINAL NEGATIVE: 1
MUSCULOSKELETAL NEGATIVE: 1
NERVOUS/ANXIOUS: 0
EYES NEGATIVE: 1
RESPIRATORY NEGATIVE: 1
ABDOMINAL PAIN: 0
CARDIOVASCULAR NEGATIVE: 1
CHILLS: 0
BRUISES/BLEEDS EASILY: 0
DIZZINESS: 0
WEAKNESS: 0
NEUROLOGICAL NEGATIVE: 1
HEADACHES: 0
PSYCHIATRIC NEGATIVE: 1
NAUSEA: 0
COUGH: 0
SHORTNESS OF BREATH: 0
PALPITATIONS: 0
FEVER: 0
CONSTITUTIONAL NEGATIVE: 1
MYALGIAS: 0

## 2025-06-20 ASSESSMENT — PAIN DESCRIPTION - PAIN TYPE: TYPE: ACUTE PAIN

## 2025-06-20 NOTE — DISCHARGE SUMMARY
Discharge Summary    CHIEF COMPLAINT ON ADMISSION  Chief Complaint   Patient presents with    Chest Pain     Patient BIBA from home for chest pain x 1 hour. Patient states HTN as only medical hx. Patient visibly uncomfortable in triage. States she was asleep when the pain started.        Reason for Admission  Chest pain     Admission Date  6/19/2025    CODE STATUS  Full Code    HPI & HOSPITAL COURSE    Patient is a 79-year-old female with past medical history of HTN, gout that presents with chest pain      In the ED, BP 70s to 140s/40s to 80s, HR 70s to 90s, RR 15-20, afebrile, saturating well on room air.  Received LR 1 L bolus x 1, nitroglycerin 0.4 mg SL x 1.  CBC unremarkable.  Potassium 4.1, bicarb 22, anion gap 14, BUN 18, creatinine 0.71, alk phos 118, troponin 11>> 13, NT proBNP 53.  Chest x-ray with left basilar atelectasis.  EKG NSR HR 87, QTc 456, borderline ST elevation in inferior leads.  EKG NSR HR 87, QTc 449, with T wave flattening lead III and aVF.  EKG NSR HR 83, QTc 454, ST elevation in lead II, T wave flattening lead III and aVF.    Cardiology was consulted and patient was started on colchicine and NSAIDs for suspected pericarditis.  She underwent a nuclear medicine cardiac stress test that was negative for ischemia or infarction.  She underwent a 2D echo that revealed LVEF 56% with mild tricuspid regurgitation and no pericardial effusion.  The next day her symptoms improved and her vitals remained stable.  She was discharged home with close outpatient follow-up with her PCP    Therefore, she is discharged in good and stable condition to home with close outpatient follow-up.    The patient recovered much more quickly than anticipated on admission.    Discharge Date  6/20/25    FOLLOW UP ITEMS POST DISCHARGE  PCP    DISCHARGE DIAGNOSES  Principal Problem (Resolved):    Chest pain, rule out acute myocardial infarction (POA: Yes)  Active Problems:    HTN (hypertension) (POA: Yes)      Overview:  Patient here for follow-up, has been taking home blood pressure readings       and keeping a log.  Daughter provided log which shows well-controlled       blood pressure with most readings 140 or below systolically and only 1       reading in the last few weeks of 160 systolically.  Patient states that       reading was taken after she had not been able to sleep all night and was       under significant amount of stress.  Patient states no further episodes of       dizziness or lightheadedness.  Patient has been off her blood pressure       medications since discharge from the hospital.    Gout (POA: Yes)    Pericarditis (POA: Yes)  Resolved Problems:    Hypotension (POA: Yes)      FOLLOW UP  No future appointments.  No follow-up provider specified.    MEDICATIONS ON DISCHARGE     Medication List        START taking these medications        Instructions   colchicine 0.6 MG Tabs  Commonly known as: Colcrys   Take 1 Tablet by mouth 2 times a day.  Dose: 0.6 mg     ibuprofen 600 MG Tabs  Commonly known as: Motrin   Take 1 Tablet by mouth every 8 hours for 14 days.  Dose: 600 mg            CONTINUE taking these medications        Instructions   allopurinol 300 MG Tabs  Commonly known as: Zyloprim   Take 1 Tablet by mouth every day.  Dose: 300 mg     amLODIPine 10 MG Tabs  Commonly known as: Norvasc   Take 10 mg by mouth every day.  Dose: 10 mg     CENTRUM PO   Take 1 Tablet by mouth every day.  Dose: 1 Tablet     cetirizine 10 MG Tabs  Commonly known as: ZyrTEC   Take 10 mg by mouth every day.  Dose: 10 mg     losartan 100 MG Tabs  Commonly known as: Cozaar   Take 100 mg by mouth every day.  Dose: 100 mg            STOP taking these medications      cephALEXin 500 MG Caps  Commonly known as: Keflex              Allergies  Allergies[1]    DIET  Orders Placed This Encounter   Procedures    Diet Order Diet: Cardiac     Standing Status:   Standing     Number of Occurrences:   1     Diet::   Cardiac [6]       ACTIVITY  As  tolerated.  Weight bearing as tolerated    CONSULTATIONS  Cardiology    PROCEDURES  none    LABORATORY  Lab Results   Component Value Date    SODIUM 139 06/20/2025    POTASSIUM 3.9 06/20/2025    CHLORIDE 107 06/20/2025    CO2 21 06/20/2025    GLUCOSE 107 (H) 06/20/2025    BUN 19 06/20/2025    CREATININE 0.70 06/20/2025        Lab Results   Component Value Date    WBC 7.6 06/20/2025    HEMOGLOBIN 12.2 06/20/2025    HEMATOCRIT 36.5 (L) 06/20/2025    PLATELETCT 252 06/20/2025        Total time of the discharge process exceeds 32 minutes.         [1] No Known Allergies

## 2025-06-20 NOTE — PROGRESS NOTES
Discharge orders received.  Patient arrived to the discharge lounge.  PIV removed by bedside RN prior to arrival. AVS instructions given, medications reviewed and general discharge education provided to patient.  Follow up appointments discussed.  Patient verbalized understanding of dc instructions and prescriptions.  Patient signed discharge instructions.  Patient verbalized understanding and had all belongings with her.  Patient left with family. Wished patient a speedy recovery.   oral

## 2025-06-20 NOTE — PROGRESS NOTES
Patient returned from nuclear medicine via transport. Patient and daughter updated on the POC including NPO until we receive the stress test reading. Echo tech updated that the patient is back to the room.

## 2025-06-20 NOTE — CARE PLAN
The patient is Stable - Low risk of patient condition declining or worsening    Shift Goals  Clinical Goals: echo, stress test  Patient Goals: feel better  Family Goals: YOVANA    Progress made toward(s) clinical / shift goals:    Problem: Knowledge Deficit - Standard  Goal: Patient and family/care givers will demonstrate understanding of plan of care, disease process/condition, diagnostic tests and medications  Description: Target End Date:  1-3 days or as soon as patient condition allows    Document in Patient Education    1.  Patient and family/caregiver oriented to unit, equipment, visitation policy and means for communicating concern  2.  Complete/review Learning Assessment  3.  Assess knowledge level of disease process/condition, treatment plan, diagnostic tests and medications  4.  Explain disease process/condition, treatment plan, diagnostic tests and medications  Outcome: Progressing     Problem: Hemodynamics  Goal: Patient's hemodynamics, fluid balance and neurologic status will be stable or improve  Description: Target End Date:  Prior to discharge or change in level of care    Document on Assessment and I/O flowsheet templates    1.  Monitor vital signs, pulse oximetry and cardiac monitor per provider order and/or policy  2.  Maintain blood pressure per provider order  3.  Hemodynamic monitoring per provider order  4.  Manage IV fluids and IV infusions  5.  Monitor intake and output  6.  Daily weights per unit policy or provider order  7.  Assess peripheral pulses and capillary refill  8.  Assess color and body temperature  9.  Position patient for maximum circulation/cardiac output  10. Monitor for signs/symptoms of excessive bleeding  11. Assess mental status, restlessness and changes in level of consciousness  12. Monitor temperature and report fever or hypothermia to provider immediately. Consideration of targeted temperature management.  Outcome: Progressing     Problem: Self Care  Goal: Patient will  have the ability to perform ADLs independently or with assistance (bathe, groom, dress, toilet and feed)  Description: Target End Date:  Prior to discharge or change in level of care    Document on ADL flowsheet    1.  Assess the capability and level of deficiency to perform ADLs  2.  Encourage family/care giver involvement  3.  Provide assistive devices  4.  Consider PT/OT evaluations  5.  Maintain support, give positive feedback, encourage self-care allowing extra time and verbal cuing as needed  6.  Avoid doing something for patients they can do themselves, but provide assistance as needed  7.  Assist in anticipating/planning individual needs  8.  Collaborate with Case Management and  to meet discharge needs  Outcome: Progressing     Problem: Fall Risk  Goal: Patient will remain free from falls  Description: Target End Date:  Prior to discharge or change in level of care    Document interventions on the Jazzy Zaragoza Fall Risk Assessment    1.  Assess for fall risk factors  2.  Implement fall precautions  Outcome: Progressing       Patient is not progressing towards the following goals:

## 2025-06-20 NOTE — PROGRESS NOTES
4 Eyes Skin Assessment Completed by CAROLIN Delgado and CAROLIN Arana.    Skin assessment is primarily focused on high risk bony prominences. Pay special attention to skin beneath and around medical devices, high risk bony prominences, skin to skin areas and areas where the patient lacks sensation to feel pain and areas where the patient previously had breakdown.     Head (Occipital):  WDL   Ears (Under Medical Devices): WDL   Nose (Under Medical Devices): WDL   Mouth:  WDL   Neck: WDL   Breast/Chest:  Pink and Red   Shoulder Blades:  WDL   Spine:   WDL   (R) Arm/Elbow/Hand: Bruising   (L) Arm/Elbow/Hand: Bruising   Abdomen: WDL   Pannus/Groin:  WDL   Sacrum/Coccyx:   WDL   (R) Ischial Tuberosity (Sit Bones):  WDL   (L) Ischial Tuberosity (Sit Bones):  WDL   (R) Leg:  Red, Light Purple, and Scar   (L) Leg:  Rash, Light Purple, and Scar   (R) Heel:  WDL   (R) Foot/Toe: WDL   (L) Heel: WDL   (L) Foot/Toe:  WDL       DEVICES IN USE:   Respiratory Devices:  NA, patient on room air  Feeding Devices:  N/A   Lines & BP Monitoring Devices:  Peripheral IV    Orthopedic Devices:  N/A  Miscellaneous Devices:  Telemetry monitor    PROTOCOL INTERVENTIONS:   Standard/Trauma Bed:  Already in place    WOUND PHOTOS:   N/A no wounds identified    WOUND CONSULT:   N/A, no advanced wound care needs identified

## 2025-06-20 NOTE — ED NOTES
To floor via gurney with transport; AOX4 Gcs15 on room air; family at bedside with all belongings.

## 2025-06-20 NOTE — PROGRESS NOTES
Monitor Summary:  Rhythm: SR  Rate: 70-84  Ectopy: R PAC    0.15/0.10/0.36    Per monitor room interpretation

## 2025-06-20 NOTE — PROGRESS NOTES
Bedside shift report received from night shift RN. Patient A&Ox4, in bed resting comfortably. Bed in lowest, locked position with call light and belongings within reach. Fall precautions reviewed with patient. Patient and daughter updated on the POC.

## 2025-06-20 NOTE — PROGRESS NOTES
Cardiology Follow Up Progress Note    Date of Service  6/20/2025    Attending Physician  MATIAS Hines    Chief Complaint   Pericarditis.     JOE Hemphill is a 79 y.o. female admitted 6/19/2025 with above.    Interim Events  Her chest pain resolved.     Review of Systems  Review of Systems   Constitutional: Negative.  Negative for chills and fever.   HENT: Negative.  Negative for hearing loss.    Eyes: Negative.    Respiratory: Negative.  Negative for cough and shortness of breath.    Cardiovascular: Negative.  Negative for chest pain, palpitations and leg swelling.   Gastrointestinal: Negative.  Negative for abdominal pain, nausea and vomiting.   Genitourinary: Negative.  Negative for dysuria and urgency.   Musculoskeletal: Negative.  Negative for myalgias.   Skin: Negative.  Negative for rash.   Neurological: Negative.  Negative for dizziness, weakness and headaches.   Hematological:  Does not bruise/bleed easily.   Psychiatric/Behavioral: Negative.  The patient is not nervous/anxious.        Vital signs in last 24 hours  Temp:  [36.1 °C (96.9 °F)-36.8 °C (98.3 °F)] 36.8 °C (98.3 °F)  Pulse:  [65-87] 65  Resp:  [16-20] 16  BP: ()/(40-98) 119/72  SpO2:  [92 %-97 %] 92 %    Physical Exam  Physical Exam  Constitutional:       Appearance: Normal appearance. She is well-developed and normal weight.   HENT:      Head: Normocephalic and atraumatic.      Mouth/Throat:      Mouth: Mucous membranes are moist.   Eyes:      Extraocular Movements: Extraocular movements intact.      Conjunctiva/sclera: Conjunctivae normal.   Cardiovascular:      Rate and Rhythm: Normal rate and regular rhythm.      Pulses: Normal pulses.      Heart sounds: Normal heart sounds.   Pulmonary:      Effort: Pulmonary effort is normal.      Breath sounds: Normal breath sounds.   Abdominal:      General: Bowel sounds are normal.      Palpations: Abdomen is soft.   Musculoskeletal:         General: Normal range of motion.       Cervical back: Normal range of motion and neck supple.   Skin:     General: Skin is warm and dry.   Neurological:      General: No focal deficit present.      Mental Status: She is alert and oriented to person, place, and time. Mental status is at baseline.   Psychiatric:         Mood and Affect: Mood normal.         Behavior: Behavior normal.         Thought Content: Thought content normal.         Judgment: Judgment normal.         Lab Review  Lab Results   Component Value Date/Time    WBC 7.6 06/20/2025 01:48 AM    RBC 3.78 (L) 06/20/2025 01:48 AM    HEMOGLOBIN 12.2 06/20/2025 01:48 AM    HEMATOCRIT 36.5 (L) 06/20/2025 01:48 AM    MCV 96.6 06/20/2025 01:48 AM    MCH 32.3 06/20/2025 01:48 AM    MCHC 33.4 06/20/2025 01:48 AM    MPV 8.5 (L) 06/20/2025 01:48 AM      Lab Results   Component Value Date/Time    SODIUM 139 06/20/2025 01:48 AM    POTASSIUM 3.9 06/20/2025 01:48 AM    CHLORIDE 107 06/20/2025 01:48 AM    CO2 21 06/20/2025 01:48 AM    GLUCOSE 107 (H) 06/20/2025 01:48 AM    BUN 19 06/20/2025 01:48 AM    CREATININE 0.70 06/20/2025 01:48 AM      Lab Results   Component Value Date/Time    ASTSGOT 33 06/19/2025 03:21 AM    ALTSGPT 20 06/19/2025 03:21 AM     Lab Results   Component Value Date/Time    CHOLSTRLTOT 171 06/19/2025 05:15 AM    LDL 79 06/19/2025 05:15 AM    HDL 74 06/19/2025 05:15 AM    TRIGLYCERIDE 92 06/19/2025 05:15 AM    TROPONINT 12 06/20/2025 01:48 AM       Recent Labs     06/19/25  0321   NTPROBNP 53       Cardiac Imaging and Procedures Review  CARDIAC STUDIES/PROCEDURES:    ECHOCARDIOGRAM CONCLUSIONS (04/07/22)  No prior study is available for comparison.   Normal left ventricular chamber size.  The left ventricular ejection fraction is visually estimated to be 55%.  Normal inferior vena cava size and inspiratory collapse.  Right ventricular systolic pressure is estimated to be  35  mmHg.  (study result reviewed)      ECHOCARDIOGRAM CONCLUSIONS (04/07/22)  No prior study is available for  comparison.   Normal left ventricular chamber size.  The left ventricular ejection fraction is visually estimated to be 55%.  Normal inferior vena cava size and inspiratory collapse.  Right ventricular systolic pressure is estimated to be  35  mmHg.  (study result reviewed)      EKG performed on (06/19/25) was reviewed: EKG personally interpreted shows sinus rhythm.    Assessment/Plan  Pericarditis: She is clinically doing well without complaints of chest pain on NSAID and colchicine therapy.   History of hypertension and hyperlipidemia.      Additional information:  ECHOCARDIOGRAM CONCLUSIONS (06/20/25)  Normal left ventricular systolic function. The ejection fraction is   measured to be 56 % by Desai's biplane.  The right ventricle is normal in size and systolic function.  Mild tricuspid regurgitation.  No pericardial effusion.  Compared to the images of the prior study 04/07/2022, similar findings.  (study result reviewed)     MYOCARDIAL PERFUSION STUDY CONCLUSIONS (06/20/25)  NUCLEAR IMAGING INTERPRETATION  No evidence of significant jeopardized viable myocardium or prior myocardial infarction. LVEF 61%.  (study result reviewed)    Thank you for allowing me to participate in the care of this patient.  We will sign off.    Please contact me with any questions.    Juan Miguel Stuart M.D.   Cardiologist, [facility]  [phone]

## 2025-06-20 NOTE — PROGRESS NOTES
Patient arrived to [facility]U via [facility]OUR[facility] kiran, patient currently in farrell bed. No needs at this time.

## 2025-07-08 ENCOUNTER — APPOINTMENT (OUTPATIENT)
Dept: CARDIOLOGY | Facility: MEDICAL CENTER | Age: 80
End: 2025-07-08
Attending: NURSE PRACTITIONER
Payer: MEDICARE

## 2025-07-10 ENCOUNTER — OFFICE VISIT (OUTPATIENT)
Dept: CARDIOLOGY | Facility: MEDICAL CENTER | Age: 80
End: 2025-07-10
Payer: MEDICARE

## 2025-07-10 VITALS
HEIGHT: 63 IN | SYSTOLIC BLOOD PRESSURE: 124 MMHG | WEIGHT: 236 LBS | OXYGEN SATURATION: 96 % | BODY MASS INDEX: 41.82 KG/M2 | DIASTOLIC BLOOD PRESSURE: 74 MMHG | RESPIRATION RATE: 18 BRPM | HEART RATE: 98 BPM

## 2025-07-10 DIAGNOSIS — I10 PRIMARY HYPERTENSION: Primary | ICD-10-CM

## 2025-07-10 DIAGNOSIS — I30.9 ACUTE PERICARDITIS, UNSPECIFIED TYPE: ICD-10-CM

## 2025-07-10 LAB — EKG IMPRESSION: NORMAL

## 2025-07-10 PROCEDURE — 93005 ELECTROCARDIOGRAM TRACING: CPT | Mod: TC

## 2025-07-10 PROCEDURE — 99212 OFFICE O/P EST SF 10 MIN: CPT

## 2025-07-10 PROCEDURE — 93010 ELECTROCARDIOGRAM REPORT: CPT | Performed by: INTERNAL MEDICINE

## 2025-07-10 PROCEDURE — 3078F DIAST BP <80 MM HG: CPT

## 2025-07-10 PROCEDURE — 99214 OFFICE O/P EST MOD 30 MIN: CPT

## 2025-07-10 PROCEDURE — 3074F SYST BP LT 130 MM HG: CPT

## 2025-07-10 RX ORDER — COLCHICINE 0.6 MG/1
0.6 TABLET ORAL DAILY
Qty: 90 TABLET | Refills: 0 | Status: SHIPPED | OUTPATIENT
Start: 2025-07-10

## 2025-07-10 ASSESSMENT — ENCOUNTER SYMPTOMS
NEAR-SYNCOPE: 0
DYSPNEA ON EXERTION: 0
ORTHOPNEA: 0
HEADACHES: 0
DIZZINESS: 0
LIGHT-HEADEDNESS: 0
SHORTNESS OF BREATH: 0
SYNCOPE: 0
PND: 0
PALPITATIONS: 0

## 2025-07-10 ASSESSMENT — FIBROSIS 4 INDEX: FIB4 SCORE: 2.31

## 2025-07-10 NOTE — PROGRESS NOTES
Chief Complaint   Patient presents with    Hypertension     Follow up          Subjective:   Shital Hemphill is a 79 y.o. female who presents today for follow-up.     Patient of Dr. Rowland.  Current medical problems include episode of syncope in April 2022 felt to be related to postural hypotension, impaired fasting glucose, gout, arthritis, and obesity. Their last clinic visit was 10/13/2022 with Dr. Rowland.    Patient was recently hospitalized from 6/19/2025-6/20/2025 after presenting to the ED for chest pain. While in the ER repeat EKG showed widespread ST elevation and MT depression typical of pericarditis. She was started on NSAIDs and colchicine for three months. Echocardiogram was normal.     Today's visit:  Patient did not stay for follow-up with her daughter.  Patient presents to the hospital she has not had further episodes of chest pain, shortness breath, palpitation, orthopnea, PND, edema, lightheaded/dizziness or syncope.  She does report that she has been having diarrhea associated with the colchicine.  She takes her blood pressure at home and is well-controlled.  She does not smoke or use any recreational drugs.    Cardiovascular Risk Factors:  1. Smoking status: Never  2. Type II Diabetes Mellitus: No   Lab Results   Component Value Date/Time    HBA1C 6.2 (H) 06/19/2025 03:21 AM    HBA1C 5.3 10/06/2022 08:54 AM     3. Hypertension: Yes  4. Dyslipidemia: No  Cholesterol,Tot   Date Value Ref Range Status   06/19/2025 171 100 - 199 mg/dL Final     LDL   Date Value Ref Range Status   06/19/2025 79 <100 mg/dL Final     HDL   Date Value Ref Range Status   06/19/2025 74 >=40 mg/dL Final     Triglycerides   Date Value Ref Range Status   06/19/2025 92 0 - 149 mg/dL Final       Past Medical History[1]      Family History   Problem Relation Age of Onset    Hypertension Mother          Social History[2]      Allergies[3]      Current Outpatient Medications   Medication Sig    colchicine (COLCRYS) 0.6 MG Tab  "Take 1 Tablet by mouth every day.    amLODIPine (NORVASC) 10 MG Tab Take 10 mg by mouth every day.    losartan (COZAAR) 100 MG Tab Take 100 mg by mouth every day.    allopurinol (ZYLOPRIM) 300 MG Tab Take 1 Tablet by mouth every day.    Multiple Vitamins-Minerals (CENTRUM PO) Take 1 Tablet by mouth every day.    cetirizine (ZYRTEC) 10 MG Tab Take 10 mg by mouth every day.         Review of Systems   Constitutional: Negative for malaise/fatigue.   Cardiovascular:  Negative for chest pain, dyspnea on exertion, leg swelling, near-syncope, orthopnea, palpitations, paroxysmal nocturnal dyspnea and syncope.   Respiratory:  Negative for shortness of breath.    Neurological:  Negative for dizziness, headaches and light-headedness.           Objective:   /74 (BP Location: Left arm, Patient Position: Sitting)   Pulse 98   Resp 18   Ht 1.6 m (5' 3\")   Wt 107 kg (236 lb)   SpO2 96%  Body mass index is 41.81 kg/m².         Physical Exam  Vitals reviewed.   Constitutional:       General: She is not in acute distress.     Appearance: Normal appearance.   HENT:      Head: Normocephalic and atraumatic.   Cardiovascular:      Rate and Rhythm: Normal rate and regular rhythm.      Pulses: Normal pulses.      Heart sounds: No murmur heard.  Pulmonary:      Effort: Pulmonary effort is normal. No respiratory distress.      Breath sounds: Normal breath sounds.   Musculoskeletal:      Right lower leg: No edema.      Left lower leg: No edema.   Neurological:      Mental Status: She is alert and oriented to person, place, and time.      Gait: Gait normal.   Psychiatric:         Behavior: Behavior normal.             Lab Results   Component Value Date/Time    SODIUM 139 06/20/2025 01:48 AM    POTASSIUM 3.9 06/20/2025 01:48 AM    CHLORIDE 107 06/20/2025 01:48 AM    CO2 21 06/20/2025 01:48 AM    GLUCOSE 107 (H) 06/20/2025 01:48 AM    BUN 19 06/20/2025 01:48 AM    CREATININE 0.70 06/20/2025 01:48 AM      Lab Results   Component Value " Date/Time    WBC 7.6 06/20/2025 01:48 AM    RBC 3.78 (L) 06/20/2025 01:48 AM    HEMOGLOBIN 12.2 06/20/2025 01:48 AM    HEMATOCRIT 36.5 (L) 06/20/2025 01:48 AM    MCV 96.6 06/20/2025 01:48 AM    MCH 32.3 06/20/2025 01:48 AM    MCHC 33.4 06/20/2025 01:48 AM    MPV 8.5 (L) 06/20/2025 01:48 AM    NEUTSPOLYS 73.00 (H) 06/19/2025 03:21 AM    LYMPHOCYTES 16.50 (L) 06/19/2025 03:21 AM    MONOCYTES 7.60 06/19/2025 03:21 AM    EOSINOPHILS 2.00 06/19/2025 03:21 AM    BASOPHILS 0.40 06/19/2025 03:21 AM      Lab Results   Component Value Date/Time    CHOLSTRLTOT 171 06/19/2025 05:15 AM    LDL 79 06/19/2025 05:15 AM    HDL 74 06/19/2025 05:15 AM    TRIGLYCERIDE 92 06/19/2025 05:15 AM       Lab Results   Component Value Date/Time    TROPONINT 12 06/20/2025 0148     Lab Results   Component Value Date/Time    NTPROBNP 53 06/19/2025 0321     Assessment:   1. Acute pericarditis, unspecified type  - colchicine (COLCRYS) 0.6 MG Tab; Take 1 Tablet by mouth every day.  Dispense: 90 Tablet; Refill: 0    2. Primary hypertension  - EKG        Medical Decision Making:  Today's Assessment / Plan:   Acute pericarditis  - Stable, denies any further episodes of chest pain  - EKG in office interpreted by me shows sinus rhythm rate 90 without any ST elevation  - Decrease colchicine 0.6 mg daily due to GI side effects for 3 month course    Hypertension  - Good control  - continue amlodipine 10 mg daily and losartan 100 mg daily  - goal < 130/80  -Continue monitor blood at home keep a log  - Blood pressure currently managed by PCP    Return in about 1 year (around 7/10/2026) for Jackie BISHOP.  Sooner if problems.    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

## (undated) DEVICE — PACK TOTAL KNEE  (1/CA)

## (undated) DEVICE — SODIUM CHL. IRRIGATION 0.9% 3000ML (4EA/CA 65CA/PF)

## (undated) DEVICE — STOCKINETTE IMPERVIOUS 12X48 - STERILELF (10/CA)"

## (undated) DEVICE — CHLORAPREP 26 ML APPLICATOR - ORANGE TINT(25/CA)

## (undated) DEVICE — SYS BN CMNT HI VAC KT MXR BWL - (MIX-E-VAC II)  (10EA/CA)

## (undated) DEVICE — SUTURE 2-0 VICRYL PLUS CT-1 - 8 X 18 INCH(12/BX)

## (undated) DEVICE — GLOVE BIOGEL SZ 8 SURGICAL PF LTX - (50PR/BX 4BX/CA)

## (undated) DEVICE — HANDPIECE 10FT INTPLS SCT PLS IRRIGATION HAND CONTROL SET (6/PK)

## (undated) DEVICE — SUCTION INSTRUMENT YANKAUER BULBOUS TIP W/O VENT (50EA/CA)

## (undated) DEVICE — GOWN WARMING STANDARD FLEX - (30/CA)

## (undated) DEVICE — SODIUM CHL IRRIGATION 0.9% 1000ML (12EA/CA)

## (undated) DEVICE — GLOVE, LITE (PAIR)

## (undated) DEVICE — TOWEL STOP TIMEOUT SAFETY FLAG (40EA/CA)

## (undated) DEVICE — LENS/HOOD FOR SPACESUIT - (32/PK) PEEL AWAY FACE

## (undated) DEVICE — COVER LIGHT HANDLE FLEXIBLE - SOFT (2EA/PK 80PK/CA)

## (undated) DEVICE — BLADE SAGITTAL SAW DUAL CUT 25.0 X 90.0 X 1.27MM (1/EA)

## (undated) DEVICE — SENSOR OXIMETER ADULT SPO2 RD SET (20EA/BX)

## (undated) DEVICE — CANISTER SUCTION RIGID RED 1500CC (40EA/CA)

## (undated) DEVICE — STAPLER SKIN DISP - (6/BX 10BX/CA) VISISTAT

## (undated) DEVICE — PINS, HEADLESS

## (undated) DEVICE — GLOVE BIOGEL INDICATOR SZ 8 SURGICAL PF LTX - (50/BX 4BX/CA)

## (undated) DEVICE — ELECTRODE DUAL RETURN W/ CORD - (50/PK)

## (undated) DEVICE — BANDAGE ELASTIC STERILE VELCRO 6 X 5 YDS (25EA/CA)

## (undated) DEVICE — DRESSING AQUACEL AG ADVANTAGE 3.5 X 10" (10EA/BX)"

## (undated) DEVICE — TUBE CONNECTING SUCTION - CLEAR PLASTIC STERILE 72 IN (50EA/CA)

## (undated) DEVICE — SUTURE 1 VICRYL PLUS CTX - 8 X 18 INCH (12/BX)

## (undated) DEVICE — BAG SPONGE COUNT 10.25 X 32 - BLUE (250/CA)

## (undated) DEVICE — MASK AIRWAY SIZE 3 UNIQUE SILICON (10/BX)

## (undated) DEVICE — LACTATED RINGERS INJ 1000 ML - (14EA/CA 60CA/PF)

## (undated) DEVICE — HUMID-VENT HEAT AND MOISTURE EXCHANGE- (50/BX)

## (undated) DEVICE — SUTURE GENERAL

## (undated) DEVICE — WATER IRRIGATION STERILE 1000ML (12EA/CA)

## (undated) DEVICE — TIP INTPLS HFLO ML ORFC BTRY - (12/CS)  FOR SURGILAV

## (undated) DEVICE — Device

## (undated) DEVICE — PIN HEADLESS 3.2 X 89MM